# Patient Record
Sex: FEMALE | Race: WHITE | Employment: FULL TIME | URBAN - METROPOLITAN AREA
[De-identification: names, ages, dates, MRNs, and addresses within clinical notes are randomized per-mention and may not be internally consistent; named-entity substitution may affect disease eponyms.]

---

## 2016-02-17 LAB
AMB EXT HPV: NEGATIVE
AMB EXT THINPREP PAP: NEGATIVE

## 2018-07-26 ENCOUNTER — TELEPHONE (OUTPATIENT)
Dept: FAMILY MEDICINE CLINIC | Facility: CLINIC | Age: 33
End: 2018-07-26

## 2018-07-26 ENCOUNTER — OFFICE VISIT (OUTPATIENT)
Dept: FAMILY MEDICINE CLINIC | Facility: CLINIC | Age: 33
End: 2018-07-26
Payer: COMMERCIAL

## 2018-07-26 VITALS
RESPIRATION RATE: 16 BRPM | BODY MASS INDEX: 33.73 KG/M2 | TEMPERATURE: 98 F | HEIGHT: 64.5 IN | WEIGHT: 200 LBS | SYSTOLIC BLOOD PRESSURE: 126 MMHG | DIASTOLIC BLOOD PRESSURE: 80 MMHG | HEART RATE: 74 BPM

## 2018-07-26 DIAGNOSIS — R22.2 MASS OF SUBCUTANEOUS TISSUE OF BACK: ICD-10-CM

## 2018-07-26 DIAGNOSIS — R22.32 AXILLARY MASS, LEFT: Primary | ICD-10-CM

## 2018-07-26 DIAGNOSIS — Z00.00 ROUTINE MEDICAL EXAM: Primary | ICD-10-CM

## 2018-07-26 DIAGNOSIS — Z00.00 LABORATORY EXAM ORDERED AS PART OF ROUTINE GENERAL MEDICAL EXAMINATION: ICD-10-CM

## 2018-07-26 DIAGNOSIS — Z84.89 FAMILY HISTORY OF MALIGNANT HYPERTHERMIA: ICD-10-CM

## 2018-07-26 DIAGNOSIS — D49.7 PITUITARY TUMOR: ICD-10-CM

## 2018-07-26 PROCEDURE — 99385 PREV VISIT NEW AGE 18-39: CPT | Performed by: FAMILY MEDICINE

## 2018-07-26 PROCEDURE — 99213 OFFICE O/P EST LOW 20 MIN: CPT | Performed by: FAMILY MEDICINE

## 2018-07-26 RX ORDER — CLINDAMYCIN HYDROCHLORIDE 300 MG/1
1 CAPSULE ORAL 3 TIMES DAILY
COMMUNITY
Start: 2018-07-19 | End: 2018-09-06 | Stop reason: ALTCHOICE

## 2018-07-26 NOTE — PROGRESS NOTES
Pap Smear & HPV results received via fax from   Dr. Kiah Correa with [facility] in [place] [address], located at [facility] [facility], [address] in  [phone].   Results console updated & report placed in Dr. Lui Maya  In box fo

## 2018-07-26 NOTE — TELEPHONE ENCOUNTER
Message from [facility]:   Message   Received:  Today   Message Contents   Carlos Feliciano, April, RN   Caller: Unspecified (Today, 12:53 PM)             Diagnosis of malignant hyperthermia is best made based on testing performed on a muscle biopsy - I a

## 2018-07-26 NOTE — TELEPHONE ENCOUNTER
I spoke with radiologist who wanted to know if patient has had any imaging of breast?  Family history of breast cancer? You may want to do mammogram since the mass in axilla may be d/t breast problem.   He feels otherwise an ultrasound of the area would be

## 2018-07-26 NOTE — PROGRESS NOTES
Patient presents with:  New Patient  Physical: States she sees GYNE Dr. Loyd at [facility] for pap smears, last was 2016. HPI:  Eyad Kelly is a 26year old female here today for preventative visit.       Imms- had tdap with last pregnanc Unknown  Spouse name: N/A    Years of education: N/A  Number of children: N/A     Occupational History  None on file     Social History Main Topics   Smoking status: Never Smoker    Smokeless tobacco: Never Used    Alcohol use Yes    Comment: 1/wk, never a ASSESSMENT/PLAN:    1. Routine medical exam    2. Laboratory exam ordered as part of routine general medical examination  - LIPID PANEL; Future  - COMP METABOLIC PANEL (14); Future  - ASSAY, THYROID STIM HORMONE; Future    3.  Pituitary tumor  - ASSAY,

## 2018-07-26 NOTE — TELEPHONE ENCOUNTER
Routing message to Wellingtonbrennan Hollingsworth, is this something you would be able to assist with? Please see message below.

## 2018-07-26 NOTE — TELEPHONE ENCOUNTER
Pt's mother has a h/o malignant hyperthermia and is an autosomal dominant condition, so my patient could have this. She is considering having a surgery, but wants to know if she has it or not to assess her irsk.  I'm not sure who to send her to to evaluate

## 2018-07-26 NOTE — TELEPHONE ENCOUNTER
Also, she has a mass of the L posterior axilla ~ 10 x 10 cm in size and is enlarging. Please ask radiology what the appropriate imaging would be to evalluate and pend.  Thank you

## 2018-07-27 NOTE — TELEPHONE ENCOUNTER
I ordered and US of the L breast/axilla.  Will start with the [address] for imaging

## 2018-07-30 NOTE — TELEPHONE ENCOUNTER
Please ask Dr. Yvonne Higgins group if they have worked on helping to dx malignant hyperthermia (muscle bx)? If not, who would they recommend she be sent to do this?

## 2018-07-30 NOTE — TELEPHONE ENCOUNTER
I called Dr. Nicolás Rodriguez group to see if they would perform this kind of biopsy and no one in the group does complete that.  I called our hospital to verify who we should reach out to for muscle biopsy for malignant hyperthermia and Dr. Mariana Krueger said they would do

## 2018-07-31 NOTE — TELEPHONE ENCOUNTER
Referral done, please provide pt with contact information and let her know the process to determine if she has malignant hyperthermia is a muscle biopsy which would be done by a surgeon.

## 2018-08-01 ENCOUNTER — HOSPITAL ENCOUNTER (OUTPATIENT)
Dept: ULTRASOUND IMAGING | Age: 33
Discharge: HOME | End: 2018-08-01
Attending: FAMILY MEDICINE
Payer: COMMERCIAL

## 2018-08-01 DIAGNOSIS — R22.32 AXILLARY MASS, LEFT: ICD-10-CM

## 2018-08-01 PROCEDURE — 76882 US LMTD JT/FCL EVL NVASC XTR: CPT | Performed by: FAMILY MEDICINE

## 2018-08-16 ENCOUNTER — TELEPHONE (OUTPATIENT)
Dept: FAMILY MEDICINE CLINIC | Facility: CLINIC | Age: 33
End: 2018-08-16

## 2018-09-04 ENCOUNTER — TELEPHONE (OUTPATIENT)
Dept: FAMILY MEDICINE CLINIC | Facility: CLINIC | Age: 33
End: 2018-09-04

## 2018-09-04 DIAGNOSIS — D17.22 LIPOMA OF LEFT UPPER EXTREMITY: Primary | ICD-10-CM

## 2018-09-04 NOTE — TELEPHONE ENCOUNTER
Yes, I signed referral, but please discuss her mother's history at the visit, so if she agrees to remove that she is safe as well.

## 2018-09-04 NOTE — TELEPHONE ENCOUNTER
Patient would like to go see a surgeon about her lipoma. Will you refer her? She does want to come to this building. I pended an order to Dr. Willie Perez if okay with you.

## 2018-09-04 NOTE — TELEPHONE ENCOUNTER
MRI shows ~ 10 x 10 x 7.5 cm lipomatous lesion, so likely a lipoma. I know she was thinking about having this surgically removed. I know there were a lot of things to consider when thinking about this process as her mother has malignant hyperthermia.  Rey

## 2019-01-21 ENCOUNTER — APPOINTMENT (OUTPATIENT)
Dept: LAB | Age: 34
End: 2019-01-21
Attending: FAMILY MEDICINE
Payer: COMMERCIAL

## 2019-01-21 DIAGNOSIS — Z00.00 LABORATORY EXAM ORDERED AS PART OF ROUTINE GENERAL MEDICAL EXAMINATION: ICD-10-CM

## 2019-01-21 DIAGNOSIS — D49.7 PITUITARY TUMOR: ICD-10-CM

## 2019-01-21 LAB
ALBUMIN SERPL-MCNC: 3.6 G/DL (ref 3.1–4.5)
ALBUMIN/GLOB SERPL: 1 {RATIO} (ref 1–2)
ALP LIVER SERPL-CCNC: 65 U/L (ref 37–98)
ALT SERPL-CCNC: 25 U/L (ref 14–54)
ANION GAP SERPL CALC-SCNC: 4 MMOL/L (ref 0–18)
AST SERPL-CCNC: 20 U/L (ref 15–41)
BILIRUB SERPL-MCNC: 0.7 MG/DL (ref 0.1–2)
BUN BLD-MCNC: 8 MG/DL (ref 8–20)
BUN/CREAT SERPL: 8.2 (ref 10–20)
CALCIUM BLD-MCNC: 8.7 MG/DL (ref 8.3–10.3)
CHLORIDE SERPL-SCNC: 110 MMOL/L (ref 101–111)
CHOLEST SMN-MCNC: 160 MG/DL (ref ?–200)
CO2 SERPL-SCNC: 29 MMOL/L (ref 22–32)
CREAT BLD-MCNC: 0.97 MG/DL (ref 0.55–1.02)
GLOBULIN PLAS-MCNC: 3.7 G/DL (ref 2.8–4.4)
GLUCOSE BLD-MCNC: 71 MG/DL (ref 70–99)
HDLC SERPL-MCNC: 48 MG/DL (ref 40–59)
LDLC SERPL CALC-MCNC: 90 MG/DL (ref ?–100)
M PROTEIN MFR SERPL ELPH: 7.3 G/DL (ref 6.4–8.2)
NONHDLC SERPL-MCNC: 112 MG/DL (ref ?–130)
OSMOLALITY SERPL CALC.SUM OF ELEC: 293 MOSM/KG (ref 275–295)
POTASSIUM SERPL-SCNC: 4.5 MMOL/L (ref 3.6–5.1)
SODIUM SERPL-SCNC: 143 MMOL/L (ref 136–144)
TRIGL SERPL-MCNC: 112 MG/DL (ref 30–149)
TSI SER-ACNC: 0.74 MIU/ML (ref 0.35–5.5)
VLDLC SERPL CALC-MCNC: 22 MG/DL (ref 0–30)

## 2019-01-21 PROCEDURE — 36415 COLL VENOUS BLD VENIPUNCTURE: CPT

## 2019-01-21 PROCEDURE — 84443 ASSAY THYROID STIM HORMONE: CPT

## 2019-01-21 PROCEDURE — 80061 LIPID PANEL: CPT

## 2019-01-21 PROCEDURE — 80053 COMPREHEN METABOLIC PANEL: CPT

## 2019-01-23 ENCOUNTER — TELEPHONE (OUTPATIENT)
Dept: FAMILY MEDICINE CLINIC | Facility: CLINIC | Age: 34
End: 2019-01-23

## 2019-03-28 ENCOUNTER — OFFICE VISIT (OUTPATIENT)
Dept: FAMILY MEDICINE CLINIC | Facility: CLINIC | Age: 34
End: 2019-03-28
Payer: COMMERCIAL

## 2019-03-28 VITALS
TEMPERATURE: 98 F | RESPIRATION RATE: 16 BRPM | SYSTOLIC BLOOD PRESSURE: 122 MMHG | BODY MASS INDEX: 35.25 KG/M2 | HEIGHT: 64.5 IN | DIASTOLIC BLOOD PRESSURE: 78 MMHG | HEART RATE: 72 BPM | WEIGHT: 209 LBS

## 2019-03-28 DIAGNOSIS — J02.9 PHARYNGITIS, UNSPECIFIED ETIOLOGY: ICD-10-CM

## 2019-03-28 DIAGNOSIS — H69.82 EUSTACHIAN TUBE DYSFUNCTION, LEFT: ICD-10-CM

## 2019-03-28 DIAGNOSIS — J02.0 STREP PHARYNGITIS: Primary | ICD-10-CM

## 2019-03-28 LAB
CONTROL LINE PRESENT WITH A CLEAR BACKGROUND (YES/NO): YES YES/NO
CONTROL LINE PRESENT WITH A CLEAR BACKGROUND (YES/NO): YES YES/NO
MONONUCLEOSIS TEST, QUAL: NEGATIVE
STREP GRP A CUL-SCR: POSITIVE

## 2019-03-28 PROCEDURE — 36415 COLL VENOUS BLD VENIPUNCTURE: CPT | Performed by: FAMILY MEDICINE

## 2019-03-28 PROCEDURE — 86308 HETEROPHILE ANTIBODY SCREEN: CPT | Performed by: FAMILY MEDICINE

## 2019-03-28 PROCEDURE — 87880 STREP A ASSAY W/OPTIC: CPT | Performed by: FAMILY MEDICINE

## 2019-03-28 PROCEDURE — 99213 OFFICE O/P EST LOW 20 MIN: CPT | Performed by: FAMILY MEDICINE

## 2019-03-28 RX ORDER — AMOXICILLIN AND CLAVULANATE POTASSIUM 875; 125 MG/1; MG/1
1 TABLET, FILM COATED ORAL 2 TIMES DAILY
Qty: 20 TABLET | Refills: 0 | Status: SHIPPED | OUTPATIENT
Start: 2019-03-28 | End: 2019-04-07

## 2019-03-28 NOTE — PROGRESS NOTES
[facility] Visit Note  3/28/2019      Subjective:      Patient ID: Carolyn Fowler is a 26year old female. Chief Complaint:  Patient presents with:  Sore Throat: x 2 days.  States on 03/12/19 treated at [address] & was dx with Strep, above.      Return if symptoms worsen or fail to improve.

## 2019-03-28 NOTE — PATIENT INSTRUCTIONS
Pharyngitis: Strep (Confirmed)    You have had a positive test for strep throat. Strep throat is a contagious illness. It is spread by coughing, kissing or by touching others after touching your mouth or nose.  Symptoms include throat pain that is worse w · Lymph nodes getting larger or becoming soft in the middle  · You can't swallow liquids or you can't open your mouth wide because of throat pain  · Signs of dehydration. These include very dark urine or no urine, sunken eyes, and dizziness.   · Trouble theresa A medical evaluation can help find the cause of your sore throat. It can also help your healthcare provider choose the best treatment for you. The evaluation may include a health history, physical exam, and diagnostic tests.   Health history  Your healthcar · Gargle with warm saltwater (1 teaspoon of salt to 8 ounces of warm water). · Use a humidifier to keep air moist and relieve throat dryness. · Try over-the-counter pain relievers such as acetaminophen or ibuprofen.  Use as directed, and don’t exceed the · A skin rash, hives, or wheezing develops. Any of these could signal an allergic reaction to antibiotics. · Symptoms don’t improve within a week. · Symptoms don’t improve within 2 to 3 days of starting antibiotics.    Date Last Reviewed: 10/1/2016  © 200 · Remember: unless a sore throat is caused by a bacterial infection, antibiotics won’t help you. Prevent future sore throats  Prevention tips include the following:  · Stop smoking or reduce contact with secondhand smoke.  Smoke irritates the tender throat

## 2019-04-04 ENCOUNTER — TELEPHONE (OUTPATIENT)
Dept: FAMILY MEDICINE CLINIC | Facility: CLINIC | Age: 34
End: 2019-04-04

## 2019-04-04 DIAGNOSIS — R19.5 ABNORMAL FINDINGS IN STOOL: Primary | ICD-10-CM

## 2019-04-04 NOTE — TELEPHONE ENCOUNTER
One of 2 ways. She can collect a stool sample and we can call with results.  Or, she can do the scotch tape method, which is at night ~12-2 AM (they come out in the middle of the night) use a piece of scotch tape around the anus to see if any tiny white wor

## 2019-04-04 NOTE — TELEPHONE ENCOUNTER
Patient states her son tested positive for strep throat, he is not a patient here but she has questions about her own family's health. She thinks after researching that he may have some kind of worms and wants to know if the family should be tested.  Her so

## 2019-04-04 NOTE — TELEPHONE ENCOUNTER
Spoke to pt, last OV 3/28/19 Dr. Nick Mooney with +Strep and treated with ABTX which pt states she is still on. Son diagnosed with strep yesterday per Ped MD, just potty trained and mother was using a cream on his buttocks for redness recently.  She googled t

## 2019-04-05 NOTE — TELEPHONE ENCOUNTER
Informed pt of Dr. Errol Bernabe' recommendations, pt is opting for the scotch tape test. Pt will keep us informed if positive results. Task completed at this point, can reopen with pt's call.

## 2019-07-01 ENCOUNTER — MED REC SCAN ONLY (OUTPATIENT)
Dept: FAMILY MEDICINE CLINIC | Facility: CLINIC | Age: 34
End: 2019-07-01

## 2020-02-04 ENCOUNTER — LAB ENCOUNTER (OUTPATIENT)
Dept: LAB | Age: 35
End: 2020-02-04
Attending: FAMILY MEDICINE
Payer: COMMERCIAL

## 2020-02-04 ENCOUNTER — OFFICE VISIT (OUTPATIENT)
Dept: FAMILY MEDICINE CLINIC | Facility: CLINIC | Age: 35
End: 2020-02-04
Payer: COMMERCIAL

## 2020-02-04 VITALS
HEIGHT: 64 IN | HEART RATE: 60 BPM | DIASTOLIC BLOOD PRESSURE: 66 MMHG | SYSTOLIC BLOOD PRESSURE: 118 MMHG | BODY MASS INDEX: 35.17 KG/M2 | TEMPERATURE: 97 F | OXYGEN SATURATION: 99 % | RESPIRATION RATE: 16 BRPM | WEIGHT: 206 LBS

## 2020-02-04 DIAGNOSIS — M25.50 POLYARTHRALGIA: ICD-10-CM

## 2020-02-04 DIAGNOSIS — Z00.00 LABORATORY EXAM ORDERED AS PART OF ROUTINE GENERAL MEDICAL EXAMINATION: ICD-10-CM

## 2020-02-04 DIAGNOSIS — R79.89 ELEVATED TESTOSTERONE LEVEL IN FEMALE: ICD-10-CM

## 2020-02-04 DIAGNOSIS — E55.9 VITAMIN D DEFICIENCY: ICD-10-CM

## 2020-02-04 DIAGNOSIS — D49.7 PITUITARY TUMOR: ICD-10-CM

## 2020-02-04 DIAGNOSIS — Z00.00 ROUTINE MEDICAL EXAM: Primary | ICD-10-CM

## 2020-02-04 DIAGNOSIS — Z82.61 FAMILY HISTORY OF RHEUMATOID ARTHRITIS: ICD-10-CM

## 2020-02-04 LAB
ALBUMIN SERPL-MCNC: 3.7 G/DL (ref 3.4–5)
ALBUMIN/GLOB SERPL: 0.9 {RATIO} (ref 1–2)
ALP LIVER SERPL-CCNC: 65 U/L (ref 37–98)
ALT SERPL-CCNC: 18 U/L (ref 13–56)
ANION GAP SERPL CALC-SCNC: 4 MMOL/L (ref 0–18)
AST SERPL-CCNC: 14 U/L (ref 15–37)
BASOPHILS # BLD AUTO: 0.03 X10(3) UL (ref 0–0.2)
BASOPHILS NFR BLD AUTO: 0.5 %
BILIRUB SERPL-MCNC: 0.7 MG/DL (ref 0.1–2)
BUN BLD-MCNC: 8 MG/DL (ref 7–18)
BUN/CREAT SERPL: 9.8 (ref 10–20)
CALCIUM BLD-MCNC: 9 MG/DL (ref 8.5–10.1)
CHLORIDE SERPL-SCNC: 108 MMOL/L (ref 98–112)
CHOLEST SMN-MCNC: 192 MG/DL (ref ?–200)
CO2 SERPL-SCNC: 27 MMOL/L (ref 21–32)
CREAT BLD-MCNC: 0.82 MG/DL (ref 0.55–1.02)
CRP SERPL-MCNC: <0.29 MG/DL (ref ?–0.3)
DEPRECATED RDW RBC AUTO: 41.5 FL (ref 35.1–46.3)
EOSINOPHIL # BLD AUTO: 0.05 X10(3) UL (ref 0–0.7)
EOSINOPHIL NFR BLD AUTO: 0.9 %
ERYTHROCYTE [DISTWIDTH] IN BLOOD BY AUTOMATED COUNT: 12.3 % (ref 11–15)
GLOBULIN PLAS-MCNC: 3.9 G/DL (ref 2.8–4.4)
GLUCOSE BLD-MCNC: 77 MG/DL (ref 70–99)
HCT VFR BLD AUTO: 41.4 % (ref 35–48)
HDLC SERPL-MCNC: 50 MG/DL (ref 40–59)
HGB BLD-MCNC: 13.5 G/DL (ref 12–16)
IMM GRANULOCYTES # BLD AUTO: 0.01 X10(3) UL (ref 0–1)
IMM GRANULOCYTES NFR BLD: 0.2 %
LDLC SERPL CALC-MCNC: 97 MG/DL (ref ?–100)
LYMPHOCYTES # BLD AUTO: 1.72 X10(3) UL (ref 1–4)
LYMPHOCYTES NFR BLD AUTO: 30.4 %
M PROTEIN MFR SERPL ELPH: 7.6 G/DL (ref 6.4–8.2)
MCH RBC QN AUTO: 30 PG (ref 26–34)
MCHC RBC AUTO-ENTMCNC: 32.6 G/DL (ref 31–37)
MCV RBC AUTO: 92 FL (ref 80–100)
MONOCYTES # BLD AUTO: 0.35 X10(3) UL (ref 0.1–1)
MONOCYTES NFR BLD AUTO: 6.2 %
NEUTROPHILS # BLD AUTO: 3.5 X10 (3) UL (ref 1.5–7.7)
NEUTROPHILS # BLD AUTO: 3.5 X10(3) UL (ref 1.5–7.7)
NEUTROPHILS NFR BLD AUTO: 61.8 %
NONHDLC SERPL-MCNC: 142 MG/DL (ref ?–130)
OSMOLALITY SERPL CALC.SUM OF ELEC: 285 MOSM/KG (ref 275–295)
PATIENT FASTING Y/N/NP: YES
PATIENT FASTING Y/N/NP: YES
PLATELET # BLD AUTO: 299 10(3)UL (ref 150–450)
POTASSIUM SERPL-SCNC: 4 MMOL/L (ref 3.5–5.1)
RBC # BLD AUTO: 4.5 X10(6)UL (ref 3.8–5.3)
RHEUMATOID FACT SERPL-ACNC: <10 IU/ML (ref ?–15)
SED RATE-ML: 13 MM/HR (ref 0–25)
SODIUM SERPL-SCNC: 139 MMOL/L (ref 136–145)
T4 FREE SERPL-MCNC: 1 NG/DL (ref 0.8–1.7)
TRIGL SERPL-MCNC: 226 MG/DL (ref 30–149)
TSI SER-ACNC: 1.32 MIU/ML (ref 0.36–3.74)
URATE SERPL-MCNC: 4.9 MG/DL (ref 2.6–6)
VIT B12 SERPL-MCNC: 311 PG/ML (ref 193–986)
VIT D+METAB SERPL-MCNC: 15.5 NG/ML (ref 30–100)
VLDLC SERPL CALC-MCNC: 45 MG/DL (ref 0–30)
WBC # BLD AUTO: 5.7 X10(3) UL (ref 4–11)

## 2020-02-04 PROCEDURE — 36415 COLL VENOUS BLD VENIPUNCTURE: CPT | Performed by: FAMILY MEDICINE

## 2020-02-04 PROCEDURE — 86431 RHEUMATOID FACTOR QUANT: CPT | Performed by: FAMILY MEDICINE

## 2020-02-04 PROCEDURE — 84439 ASSAY OF FREE THYROXINE: CPT | Performed by: FAMILY MEDICINE

## 2020-02-04 PROCEDURE — 80061 LIPID PANEL: CPT | Performed by: FAMILY MEDICINE

## 2020-02-04 PROCEDURE — 80050 GENERAL HEALTH PANEL: CPT | Performed by: FAMILY MEDICINE

## 2020-02-04 PROCEDURE — 82607 VITAMIN B-12: CPT | Performed by: FAMILY MEDICINE

## 2020-02-04 PROCEDURE — 85652 RBC SED RATE AUTOMATED: CPT | Performed by: FAMILY MEDICINE

## 2020-02-04 PROCEDURE — 84402 ASSAY OF FREE TESTOSTERONE: CPT | Performed by: FAMILY MEDICINE

## 2020-02-04 PROCEDURE — 86200 CCP ANTIBODY: CPT | Performed by: FAMILY MEDICINE

## 2020-02-04 PROCEDURE — 84403 ASSAY OF TOTAL TESTOSTERONE: CPT | Performed by: FAMILY MEDICINE

## 2020-02-04 PROCEDURE — 82306 VITAMIN D 25 HYDROXY: CPT | Performed by: FAMILY MEDICINE

## 2020-02-04 PROCEDURE — 86140 C-REACTIVE PROTEIN: CPT | Performed by: FAMILY MEDICINE

## 2020-02-04 PROCEDURE — 86038 ANTINUCLEAR ANTIBODIES: CPT | Performed by: FAMILY MEDICINE

## 2020-02-04 PROCEDURE — 99213 OFFICE O/P EST LOW 20 MIN: CPT | Performed by: FAMILY MEDICINE

## 2020-02-04 PROCEDURE — 99395 PREV VISIT EST AGE 18-39: CPT | Performed by: FAMILY MEDICINE

## 2020-02-04 PROCEDURE — 84550 ASSAY OF BLOOD/URIC ACID: CPT | Performed by: FAMILY MEDICINE

## 2020-02-04 RX ORDER — MELATONIN
2000 DAILY
COMMUNITY

## 2020-02-04 NOTE — PROGRESS NOTES
Patient presents with:  Physical: Has an appt with gyne today for her pap- Dr. Erica Saha in [id number] [address]. Lab: along with routine labs, pt is requesting Vitamin b-12 levels to be checked, she states she does take vitamin B-12 2000mcg daily.       HPI:  Tiara Press daily., Disp: , Rfl:     No current facility-administered medications on file prior to visit.      No Known Allergies  Social History    Socioeconomic History      Marital status:       Spouse name: Not on file      Number of children: Not on file of Onset   • Hypertension Father    • Heart Disease Father    • High Cholesterol Father    • Other (COPD) Father    • Other (gout) Father    • Hypertension Mother    • High Cholesterol Mother    • Malignent Hypothermia Mother    • Other (Pre-Diabetes) Moth Future  - VITAMIN B12; Future  - VITAMIN D, 25-HYDROXY; Future    3. Vitamin D deficiency  - VITAMIN D, 25-HYDROXY; Future    4. Family history of rheumatoid arthritis  - RHEUMATOID ARTHRITIS FACTOR; Future    5.  Polyarthralgia  - RHEUMATOID ARTHRITIS FACT

## 2020-02-05 LAB
ANA SCREEN: NEGATIVE
CCP IGG SERPL-ACNC: 0.4 U/ML (ref 0–6.9)

## 2020-02-10 ENCOUNTER — PATIENT MESSAGE (OUTPATIENT)
Dept: FAMILY MEDICINE CLINIC | Facility: CLINIC | Age: 35
End: 2020-02-10

## 2020-02-10 NOTE — TELEPHONE ENCOUNTER
From: Ashok Echavarria  To: Dorrie Gitelman, MD  Sent: 2/10/2020 10:55 AM CST  Subject: Referral Request    Good morning,    In 2018, Dr. Laura Márquez provided me with a referral for a surgeon who could perform a lipoma removal procedure.  I have misplaced t

## 2020-02-10 NOTE — TELEPHONE ENCOUNTER
Referral information provided to patient  Provider Address Phone   [facility], [address] [phone]

## 2020-02-13 PROBLEM — E55.9 VITAMIN D DEFICIENCY: Status: ACTIVE | Noted: 2020-02-01

## 2020-02-13 PROBLEM — E78.1 HYPERTRIGLYCERIDEMIA WITHOUT HYPERCHOLESTEROLEMIA: Status: ACTIVE | Noted: 2020-02-01

## 2020-02-14 RX ORDER — ERGOCALCIFEROL 1.25 MG/1
50000 CAPSULE ORAL WEEKLY
Qty: 8 CAPSULE | Refills: 0 | Status: SHIPPED | OUTPATIENT
Start: 2020-02-14 | End: 2020-04-14

## 2020-02-17 ENCOUNTER — OFFICE VISIT (OUTPATIENT)
Dept: SURGERY | Facility: CLINIC | Age: 35
End: 2020-02-17
Payer: COMMERCIAL

## 2020-02-17 VITALS
BODY MASS INDEX: 35 KG/M2 | HEART RATE: 60 BPM | HEIGHT: 64 IN | WEIGHT: 205 LBS | SYSTOLIC BLOOD PRESSURE: 132 MMHG | RESPIRATION RATE: 16 BRPM | DIASTOLIC BLOOD PRESSURE: 82 MMHG

## 2020-02-17 DIAGNOSIS — Z84.89 FAMILY HISTORY OF MALIGNANT HYPERTHERMIA: ICD-10-CM

## 2020-02-17 DIAGNOSIS — D17.1 LIPOMA OF SKIN AND SUBCUTANEOUS TISSUE OF TRUNK: Primary | ICD-10-CM

## 2020-02-17 PROCEDURE — 99243 OFF/OP CNSLTJ NEW/EST LOW 30: CPT | Performed by: SURGERY

## 2020-02-17 NOTE — H&P
New Patient Visit Note       Active Problems      1. Lipoma of skin and subcutaneous tissue of trunk        Chief Complaint   Patient presents with:  Mass: NW PT ref by PCP for mass, imaging done 2018.  PT states that she has had back shoulder mass since 18 Eyes: Negative for pain, discharge, redness and visual disturbance. Respiratory: Negative for cough, shortness of breath and wheezing. Cardiovascular: Negative for chest pain and palpitations.    Gastrointestinal: Negative for abdominal distention, abd The patient states that this has been present since 2007. She did see a surgeon in 2014 who recommended excision but Helio Araujo was getting  at the time and did not want to undergo surgery.   The patient states that this mass has increased in size and Expected postoperative pain, recuperation and postoperative course was also reviewed. All questions from the patient were answered in detail. The patient did verbalize understanding and does not have any further questions at this time.   The patient does

## 2020-02-24 LAB
SEX HORMONE BINDING GLOBULIN: 30 NMOL/L
TESTOSTERONE -MS, BIOAVAILAB: 6.7 NG/DL
TESTOSTERONE, -MS/MS: 14 NG/DL
TESTOSTERONE, FREE -MS/MS: 2.5 PG/ML

## 2020-03-28 ENCOUNTER — E-VISIT (OUTPATIENT)
Dept: FAMILY MEDICINE CLINIC | Facility: CLINIC | Age: 35
End: 2020-03-28

## 2020-03-28 ENCOUNTER — PATIENT MESSAGE (OUTPATIENT)
Dept: FAMILY MEDICINE CLINIC | Facility: CLINIC | Age: 35
End: 2020-03-28

## 2020-03-28 DIAGNOSIS — R05.9 COUGH: Primary | ICD-10-CM

## 2020-03-28 DIAGNOSIS — R07.0 THROAT DISCOMFORT: ICD-10-CM

## 2020-03-29 NOTE — PROGRESS NOTES
Phi Ambriz is a 37year old female. HPI:   See answers to questions above. Current Outpatient Medications   Medication Sig Dispense Refill   • ergocalciferol 1.25 MG (43353 UT) Oral Cap Take 1 capsule (50,000 Units total) by mouth once a week. Patient advised to follow up with PCP if no improvement or worsening of symptoms  Refer to MyChart message for specific patient instructions

## 2020-03-30 RX ORDER — ERGOCALCIFEROL 1.25 MG/1
CAPSULE ORAL
Qty: 4 CAPSULE | Refills: 1 | OUTPATIENT
Start: 2020-03-30

## 2020-03-30 RX ORDER — ALBUTEROL SULFATE 90 UG/1
2 AEROSOL, METERED RESPIRATORY (INHALATION) EVERY 4 HOURS PRN
Qty: 1 INHALER | Refills: 0 | Status: SHIPPED | OUTPATIENT
Start: 2020-03-30 | End: 2021-02-04 | Stop reason: ALTCHOICE

## 2020-03-30 NOTE — TELEPHONE ENCOUNTER
From: Ashok Echavarria  To: Dorrie Gitelman, MD  Sent: 3/28/2020 11:02 AM CDT  Subject: Non-Urgent Medical Question    Hi,    I have been experiencing a frog in the throat and esophagus/chest sensation on and off for two weeks.  Better in the morning an

## 2020-04-02 NOTE — TELEPHONE ENCOUNTER
Sx: increased swallowing and a \"frog\" sensation in her throat. She did a video visit on 3/28 the same day she messaged and was put on omeprazole for possible reflux and seems to be helping. Was eating fajitas with hot sauce, coffee, onions.  Seems reta

## 2020-09-30 ENCOUNTER — TELEPHONE (OUTPATIENT)
Dept: FAMILY MEDICINE CLINIC | Facility: CLINIC | Age: 35
End: 2020-09-30

## 2020-09-30 NOTE — TELEPHONE ENCOUNTER
Pt faxed Medical Record Release form on 9/29/20 to release the 2018 MRI results to [place]. Faxed results to Lasha Velásquez with [facility] on 9/30/20    Fax: [phone]  Phone: [phone]    Fax confirmation received.

## 2021-02-04 ENCOUNTER — LAB ENCOUNTER (OUTPATIENT)
Dept: LAB | Age: 36
End: 2021-02-04
Attending: FAMILY MEDICINE
Payer: COMMERCIAL

## 2021-02-04 ENCOUNTER — OFFICE VISIT (OUTPATIENT)
Dept: FAMILY MEDICINE CLINIC | Facility: CLINIC | Age: 36
End: 2021-02-04
Payer: COMMERCIAL

## 2021-02-04 VITALS
WEIGHT: 202 LBS | BODY MASS INDEX: 34.49 KG/M2 | SYSTOLIC BLOOD PRESSURE: 120 MMHG | RESPIRATION RATE: 16 BRPM | OXYGEN SATURATION: 98 % | DIASTOLIC BLOOD PRESSURE: 70 MMHG | HEIGHT: 64 IN | TEMPERATURE: 98 F | HEART RATE: 96 BPM

## 2021-02-04 DIAGNOSIS — Z00.00 ROUTINE MEDICAL EXAM: Primary | ICD-10-CM

## 2021-02-04 DIAGNOSIS — E55.9 VITAMIN D DEFICIENCY: ICD-10-CM

## 2021-02-04 DIAGNOSIS — E78.1 HYPERTRIGLYCERIDEMIA WITHOUT HYPERCHOLESTEROLEMIA: ICD-10-CM

## 2021-02-04 DIAGNOSIS — Z00.00 LABORATORY EXAM ORDERED AS PART OF ROUTINE GENERAL MEDICAL EXAMINATION: ICD-10-CM

## 2021-02-04 PROBLEM — R22.2 MASS OF SUBCUTANEOUS TISSUE OF BACK: Status: RESOLVED | Noted: 2018-07-26 | Resolved: 2021-02-04

## 2021-02-04 LAB
ALBUMIN SERPL-MCNC: 3.8 G/DL (ref 3.4–5)
ALBUMIN/GLOB SERPL: 1.1 {RATIO} (ref 1–2)
ALP LIVER SERPL-CCNC: 60 U/L
ALT SERPL-CCNC: 21 U/L
ANION GAP SERPL CALC-SCNC: 4 MMOL/L (ref 0–18)
AST SERPL-CCNC: 20 U/L (ref 15–37)
BASOPHILS # BLD AUTO: 0.04 X10(3) UL (ref 0–0.2)
BASOPHILS NFR BLD AUTO: 0.8 %
BILIRUB SERPL-MCNC: 0.7 MG/DL (ref 0.1–2)
BUN BLD-MCNC: 10 MG/DL (ref 7–18)
BUN/CREAT SERPL: 11.6 (ref 10–20)
CALCIUM BLD-MCNC: 8.9 MG/DL (ref 8.5–10.1)
CHLORIDE SERPL-SCNC: 110 MMOL/L (ref 98–112)
CHOLEST SMN-MCNC: 208 MG/DL (ref ?–200)
CO2 SERPL-SCNC: 28 MMOL/L (ref 21–32)
CREAT BLD-MCNC: 0.86 MG/DL
DEPRECATED RDW RBC AUTO: 42.1 FL (ref 35.1–46.3)
EOSINOPHIL # BLD AUTO: 0.07 X10(3) UL (ref 0–0.7)
EOSINOPHIL NFR BLD AUTO: 1.4 %
ERYTHROCYTE [DISTWIDTH] IN BLOOD BY AUTOMATED COUNT: 12.2 % (ref 11–15)
GLOBULIN PLAS-MCNC: 3.6 G/DL (ref 2.8–4.4)
GLUCOSE BLD-MCNC: 70 MG/DL (ref 70–99)
HCT VFR BLD AUTO: 43.5 %
HDLC SERPL-MCNC: 57 MG/DL (ref 40–59)
HGB BLD-MCNC: 13.9 G/DL
IMM GRANULOCYTES # BLD AUTO: 0.03 X10(3) UL (ref 0–1)
IMM GRANULOCYTES NFR BLD: 0.6 %
LDLC SERPL CALC-MCNC: 115 MG/DL (ref ?–100)
LYMPHOCYTES # BLD AUTO: 1.38 X10(3) UL (ref 1–4)
LYMPHOCYTES NFR BLD AUTO: 27.1 %
M PROTEIN MFR SERPL ELPH: 7.4 G/DL (ref 6.4–8.2)
MCH RBC QN AUTO: 30 PG (ref 26–34)
MCHC RBC AUTO-ENTMCNC: 32 G/DL (ref 31–37)
MCV RBC AUTO: 94 FL
MONOCYTES # BLD AUTO: 0.45 X10(3) UL (ref 0.1–1)
MONOCYTES NFR BLD AUTO: 8.8 %
NEUTROPHILS # BLD AUTO: 3.13 X10 (3) UL (ref 1.5–7.7)
NEUTROPHILS # BLD AUTO: 3.13 X10(3) UL (ref 1.5–7.7)
NEUTROPHILS NFR BLD AUTO: 61.3 %
NONHDLC SERPL-MCNC: 151 MG/DL (ref ?–130)
OSMOLALITY SERPL CALC.SUM OF ELEC: 291 MOSM/KG (ref 275–295)
PATIENT FASTING Y/N/NP: YES
PATIENT FASTING Y/N/NP: YES
PLATELET # BLD AUTO: 292 10(3)UL (ref 150–450)
POTASSIUM SERPL-SCNC: 4.4 MMOL/L (ref 3.5–5.1)
RBC # BLD AUTO: 4.63 X10(6)UL
SODIUM SERPL-SCNC: 142 MMOL/L (ref 136–145)
TRIGL SERPL-MCNC: 178 MG/DL (ref 30–149)
TSI SER-ACNC: 1.5 MIU/ML (ref 0.36–3.74)
VIT D+METAB SERPL-MCNC: 25 NG/ML (ref 30–100)
VLDLC SERPL CALC-MCNC: 36 MG/DL (ref 0–30)
WBC # BLD AUTO: 5.1 X10(3) UL (ref 4–11)

## 2021-02-04 PROCEDURE — 85025 COMPLETE CBC W/AUTO DIFF WBC: CPT

## 2021-02-04 PROCEDURE — 82306 VITAMIN D 25 HYDROXY: CPT

## 2021-02-04 PROCEDURE — 99395 PREV VISIT EST AGE 18-39: CPT | Performed by: FAMILY MEDICINE

## 2021-02-04 PROCEDURE — 3074F SYST BP LT 130 MM HG: CPT | Performed by: FAMILY MEDICINE

## 2021-02-04 PROCEDURE — 36415 COLL VENOUS BLD VENIPUNCTURE: CPT

## 2021-02-04 PROCEDURE — 80061 LIPID PANEL: CPT

## 2021-02-04 PROCEDURE — 84443 ASSAY THYROID STIM HORMONE: CPT

## 2021-02-04 PROCEDURE — 80053 COMPREHEN METABOLIC PANEL: CPT

## 2021-02-04 PROCEDURE — 3008F BODY MASS INDEX DOCD: CPT | Performed by: FAMILY MEDICINE

## 2021-02-04 PROCEDURE — 3078F DIAST BP <80 MM HG: CPT | Performed by: FAMILY MEDICINE

## 2021-02-04 RX ORDER — PROPRANOLOL/HYDROCHLOROTHIAZID 40 MG-25MG
2 TABLET ORAL DAILY
COMMUNITY
Start: 2021-01-25

## 2021-02-04 RX ORDER — CHLORAL HYDRATE 500 MG
1 CAPSULE ORAL DAILY
COMMUNITY
Start: 2021-01-25

## 2021-02-04 RX ORDER — CHOLECALCIFEROL (VITAMIN D3) 125 MCG
2 CAPSULE ORAL DAILY
COMMUNITY
Start: 2021-01-01

## 2021-02-04 NOTE — PROGRESS NOTES
Patient presents with:  Physical: pap is scheduled for 03/08/21 with Dr. Konrad Sheets      HPI:  Luisito Conner is a 38year old female here today for preventative visit. Oyim-kb-ag-date with Tdap & flu.  Will discuss covid vaccines.        Cervical endocrine clinic unless recommended by PCP.   RTC prn.            Past Medical History:   Diagnosis Date   • Abnormal Pap smear of cervix 2008    + LEEP, Dr. Denver Vasquez, [facility], + ASCUS neg HPV on 1/29/19 pap   • Bilateral plantar fasciitis    • Chemical p Male        Birth control/protection: Vasectomy    Lifestyle      Physical activity        Days per week: Not on file        Minutes per session: Not on file      Stress: Not on file    Relationships      Social connections        Talks on phone: Not on fi Extraocular movements intact, pupils equally round and reactive to light,  conjunctivae normal.    Ears- Tympanic membranes intact bilaterally. Nose/Mouth/Throat-wearing mask  NECK:  No submental, submandibular, ant/post cervical lymphadenopathy.  No thy

## 2021-03-23 ENCOUNTER — PATIENT MESSAGE (OUTPATIENT)
Dept: FAMILY MEDICINE CLINIC | Facility: CLINIC | Age: 36
End: 2021-03-23

## 2021-03-23 NOTE — TELEPHONE ENCOUNTER
From: Dakota Echavarria  To: Kacie Ng MD  Sent: 3/23/2021 9:35 AM CDT  Subject: Other    Hi,    I have received my first dose of the [facility]. The second dose is scheduled April 10th.     Thanks,  Arie Galeana

## 2021-04-12 ENCOUNTER — PATIENT MESSAGE (OUTPATIENT)
Dept: FAMILY MEDICINE CLINIC | Facility: CLINIC | Age: 36
End: 2021-04-12

## 2021-04-12 NOTE — TELEPHONE ENCOUNTER
From: Rafael Echavarria  To: Therese Yung MD  Sent: 4/12/2021 10:50 AM CDT  Subject: Other    Good Morning,    I have received my second dose of the [employer] covid vaccine through [place] on April 9th. Please update my chart.     Thank you,  Lizzy Branch

## 2021-11-15 ENCOUNTER — OFFICE VISIT (OUTPATIENT)
Dept: FAMILY MEDICINE CLINIC | Facility: CLINIC | Age: 36
End: 2021-11-15
Payer: COMMERCIAL

## 2021-11-15 VITALS
RESPIRATION RATE: 16 BRPM | OXYGEN SATURATION: 98 % | HEART RATE: 72 BPM | DIASTOLIC BLOOD PRESSURE: 70 MMHG | BODY MASS INDEX: 36.7 KG/M2 | HEIGHT: 64 IN | SYSTOLIC BLOOD PRESSURE: 120 MMHG | TEMPERATURE: 98 F | WEIGHT: 215 LBS

## 2021-11-15 DIAGNOSIS — M54.6 ACUTE LEFT-SIDED THORACIC BACK PAIN: Primary | ICD-10-CM

## 2021-11-15 PROCEDURE — 3008F BODY MASS INDEX DOCD: CPT | Performed by: FAMILY MEDICINE

## 2021-11-15 PROCEDURE — 99213 OFFICE O/P EST LOW 20 MIN: CPT | Performed by: FAMILY MEDICINE

## 2021-11-15 PROCEDURE — 3078F DIAST BP <80 MM HG: CPT | Performed by: FAMILY MEDICINE

## 2021-11-15 PROCEDURE — 3074F SYST BP LT 130 MM HG: CPT | Performed by: FAMILY MEDICINE

## 2021-11-15 NOTE — PROGRESS NOTES
[facility] Visit Note  11/15/2021      Subjective:      Patient ID: Eric Weiss is a 43year old female. Chief Complaint:  Patient presents with:  Low Back Pain: left low back in the mornings.       HPI:  Eric Weiss is a 43 year ol

## 2021-11-15 NOTE — PATIENT INSTRUCTIONS
Back Exercises: Abdominal Lift Brace with Marching    The abdominal lift brace with march strengthens your lower abdominal muscles, helping you keep your pelvis and back stable:  · Lie on the floor with both knees bent.  Put your feet flat on the floor an slightly. · Hold for 5 seconds. Return to starting position. · Repeat 5 times. Claire last reviewed this educational content on 3/1/2018  © 4408-7190 [employer]. All rights reserved.  This information is not intended as a substitute for should feel comfortable and relaxed in this position:  · Start by tightening your abdominal muscles. · Lift one bent knee off the floor 2 to 4 inches. · Hold for 10 seconds. Return to start position. · Repeat 3 times. · Switch legs.   [employer] last revi

## 2022-01-07 ENCOUNTER — HOSPITAL ENCOUNTER (OUTPATIENT)
Age: 37
Discharge: HOME | End: 2022-01-07
Payer: COMMERCIAL

## 2022-01-07 VITALS
RESPIRATION RATE: 18 BRPM | HEART RATE: 72 BPM | OXYGEN SATURATION: 99 % | DIASTOLIC BLOOD PRESSURE: 89 MMHG | TEMPERATURE: 98 F | SYSTOLIC BLOOD PRESSURE: 149 MMHG

## 2022-01-07 DIAGNOSIS — Z20.822 EXPOSURE TO COVID-19 VIRUS: Primary | ICD-10-CM

## 2022-01-07 PROCEDURE — U0003 INFECTIOUS AGENT DETECTION BY NUCLEIC ACID (DNA OR RNA); SEVERE ACUTE RESPIRATORY SYNDROME CORONAVIRUS 2 (SARS-COV-2) (CORONAVIRUS DISEASE [COVID-19]), AMPLIFIED PROBE TECHNIQUE, MAKING USE OF HIGH THROUGHPUT TECHNOLOGIES AS DESCRIBED BY CMS-2020-01-R: HCPCS

## 2022-01-09 LAB — SARS-COV-2 RNA RESP QL NAA+PROBE: NOT DETECTED

## 2022-02-14 ENCOUNTER — OFFICE VISIT (OUTPATIENT)
Dept: FAMILY MEDICINE CLINIC | Facility: CLINIC | Age: 37
End: 2022-02-14
Payer: COMMERCIAL

## 2022-02-14 VITALS
HEIGHT: 64 IN | RESPIRATION RATE: 16 BRPM | OXYGEN SATURATION: 99 % | TEMPERATURE: 98 F | HEART RATE: 64 BPM | BODY MASS INDEX: 37.73 KG/M2 | SYSTOLIC BLOOD PRESSURE: 122 MMHG | DIASTOLIC BLOOD PRESSURE: 72 MMHG | WEIGHT: 221 LBS

## 2022-02-14 DIAGNOSIS — E78.1 HYPERTRIGLYCERIDEMIA WITHOUT HYPERCHOLESTEROLEMIA: ICD-10-CM

## 2022-02-14 DIAGNOSIS — Z00.00 ROUTINE MEDICAL EXAM: Primary | ICD-10-CM

## 2022-02-14 DIAGNOSIS — Z00.00 LABORATORY EXAM ORDERED AS PART OF ROUTINE GENERAL MEDICAL EXAMINATION: ICD-10-CM

## 2022-02-14 DIAGNOSIS — Z71.84 TRAVEL ADVICE ENCOUNTER: ICD-10-CM

## 2022-02-14 DIAGNOSIS — E55.9 VITAMIN D DEFICIENCY: ICD-10-CM

## 2022-02-14 PROCEDURE — 3074F SYST BP LT 130 MM HG: CPT | Performed by: FAMILY MEDICINE

## 2022-02-14 PROCEDURE — 99395 PREV VISIT EST AGE 18-39: CPT | Performed by: FAMILY MEDICINE

## 2022-02-14 PROCEDURE — 3078F DIAST BP <80 MM HG: CPT | Performed by: FAMILY MEDICINE

## 2022-02-14 PROCEDURE — 3008F BODY MASS INDEX DOCD: CPT | Performed by: FAMILY MEDICINE

## 2022-02-14 RX ORDER — SCOLOPAMINE TRANSDERMAL SYSTEM 1 MG/1
1 PATCH, EXTENDED RELEASE TRANSDERMAL
Qty: 10 PATCH | Refills: 0 | Status: SHIPPED | OUTPATIENT
Start: 2022-02-14

## 2022-05-18 ENCOUNTER — VIRTUAL PHONE E/M (OUTPATIENT)
Dept: FAMILY MEDICINE CLINIC | Facility: CLINIC | Age: 37
End: 2022-05-18
Payer: COMMERCIAL

## 2022-05-18 DIAGNOSIS — U07.1 COVID-19: Primary | ICD-10-CM

## 2022-05-18 PROCEDURE — 99442 PHONE E/M BY PHYS 11-20 MIN: CPT | Performed by: FAMILY MEDICINE

## 2022-05-18 NOTE — PROGRESS NOTES
Virtual Telephone Check-In    Katya Babcock verbally consents to a Virtual/Telephone Check-In visit on 05/18/22. Patient has been referred to the [facility] website at [email or web address] to review the yearly Consent to Treat document. Patient understands and accepts financial responsibility for any deductible, co-insurance and/or co-pays associated with this service.     Duration of the service: 15 minutes      Summary of topics discussed:             Stewart Mcgee MD

## 2022-06-15 ENCOUNTER — PATIENT MESSAGE (OUTPATIENT)
Dept: FAMILY MEDICINE CLINIC | Facility: CLINIC | Age: 37
End: 2022-06-15

## 2022-06-15 NOTE — TELEPHONE ENCOUNTER
From: Shagufta Echavarria  To: Washington Beckman MD  Sent: 6/15/2022 8:21 AM CDT  Subject: Russell Monk Dr. Severiano Schiller,    I just recovered from Covid about two weeks ago. My last Covid booster was November 25th. When do you recommend I get my second Covid booster?     Thanks,  [employer]

## 2022-07-02 ENCOUNTER — PATIENT MESSAGE (OUTPATIENT)
Dept: FAMILY MEDICINE CLINIC | Facility: CLINIC | Age: 37
End: 2022-07-02

## 2022-07-05 ENCOUNTER — TELEPHONE (OUTPATIENT)
Dept: FAMILY MEDICINE CLINIC | Facility: CLINIC | Age: 37
End: 2022-07-05

## 2022-07-05 NOTE — TELEPHONE ENCOUNTER
Nahid Cason called and said she is leaving on 7/16/22 for a [employer] and she needs a signed letter on letterhead from a licensed physician with physicians name, address and phone number stating she has recovered from [facility] in the last 90 days and is clear to travel. She seen Dr. Willian Baumann on 5/18/22 and then had a PCR test on 5/20/22. She needs to send paperwork in ASAP because it all has to be cleared before the cruise.

## 2022-07-05 NOTE — TELEPHONE ENCOUNTER
From: Porsche Echavarria  To: Arely Clark MD  Sent: 6/15/2022 8:21 AM CDT  Subject: Derick eFrnandez,    I just recovered from Covid about two weeks ago. My last Covid booster was November 25th. When do you recommend I get my second Covid booster?     Thanks,  Parvin [employer]

## 2022-08-04 ENCOUNTER — WALK IN (OUTPATIENT)
Dept: URGENT CARE | Age: 37
End: 2022-08-04

## 2022-08-04 VITALS
WEIGHT: 220 LBS | HEIGHT: 65 IN | RESPIRATION RATE: 16 BRPM | TEMPERATURE: 98.9 F | HEART RATE: 84 BPM | OXYGEN SATURATION: 100 % | BODY MASS INDEX: 36.65 KG/M2 | DIASTOLIC BLOOD PRESSURE: 80 MMHG | SYSTOLIC BLOOD PRESSURE: 140 MMHG

## 2022-08-04 DIAGNOSIS — H10.9 BACTERIAL CONJUNCTIVITIS: Primary | ICD-10-CM

## 2022-08-04 PROCEDURE — 99202 OFFICE O/P NEW SF 15 MIN: CPT | Performed by: NURSE PRACTITIONER

## 2022-08-04 RX ORDER — CIPROFLOXACIN HYDROCHLORIDE 3.5 MG/ML
1 SOLUTION/ DROPS TOPICAL
Qty: 5 ML | Refills: 0 | Status: SHIPPED | OUTPATIENT
Start: 2022-08-04 | End: 2022-08-11

## 2022-08-04 ASSESSMENT — VISUAL ACUITY
OD_CC: 20 20
OS_CC: 20/20

## 2022-08-04 ASSESSMENT — PAIN SCALES - GENERAL: PAINLEVEL: 0

## 2023-02-03 ENCOUNTER — LAB ENCOUNTER (OUTPATIENT)
Dept: LAB | Age: 38
End: 2023-02-03
Attending: FAMILY MEDICINE
Payer: COMMERCIAL

## 2023-02-03 DIAGNOSIS — E55.9 VITAMIN D DEFICIENCY: ICD-10-CM

## 2023-02-03 DIAGNOSIS — Z00.00 LABORATORY EXAM ORDERED AS PART OF ROUTINE GENERAL MEDICAL EXAMINATION: ICD-10-CM

## 2023-02-03 DIAGNOSIS — E78.1 HYPERTRIGLYCERIDEMIA WITHOUT HYPERCHOLESTEROLEMIA: ICD-10-CM

## 2023-02-03 LAB
ALBUMIN SERPL-MCNC: 3.7 G/DL (ref 3.4–5)
ALBUMIN/GLOB SERPL: 1.1 {RATIO} (ref 1–2)
ALP LIVER SERPL-CCNC: 75 U/L
ALT SERPL-CCNC: 24 U/L
ANION GAP SERPL CALC-SCNC: 4 MMOL/L (ref 0–18)
AST SERPL-CCNC: 20 U/L (ref 15–37)
BASOPHILS # BLD AUTO: 0.05 X10(3) UL (ref 0–0.2)
BASOPHILS NFR BLD AUTO: 0.8 %
BILIRUB SERPL-MCNC: 0.7 MG/DL (ref 0.1–2)
BUN BLD-MCNC: 8 MG/DL (ref 7–18)
CALCIUM BLD-MCNC: 9.4 MG/DL (ref 8.5–10.1)
CHLORIDE SERPL-SCNC: 109 MMOL/L (ref 98–112)
CHOLEST SERPL-MCNC: 185 MG/DL (ref ?–200)
CO2 SERPL-SCNC: 27 MMOL/L (ref 21–32)
CREAT BLD-MCNC: 1.01 MG/DL
EOSINOPHIL # BLD AUTO: 0.08 X10(3) UL (ref 0–0.7)
EOSINOPHIL NFR BLD AUTO: 1.3 %
ERYTHROCYTE [DISTWIDTH] IN BLOOD BY AUTOMATED COUNT: 12.3 %
FASTING PATIENT LIPID ANSWER: YES
FASTING STATUS PATIENT QL REPORTED: YES
GFR SERPLBLD BASED ON 1.73 SQ M-ARVRAT: 73 ML/MIN/1.73M2 (ref 60–?)
GLOBULIN PLAS-MCNC: 3.5 G/DL (ref 2.8–4.4)
GLUCOSE BLD-MCNC: 84 MG/DL (ref 70–99)
HCT VFR BLD AUTO: 42.7 %
HDLC SERPL-MCNC: 49 MG/DL (ref 40–59)
HGB BLD-MCNC: 13.9 G/DL
IMM GRANULOCYTES # BLD AUTO: 0.01 X10(3) UL (ref 0–1)
IMM GRANULOCYTES NFR BLD: 0.2 %
LDLC SERPL CALC-MCNC: 103 MG/DL (ref ?–100)
LYMPHOCYTES # BLD AUTO: 1.79 X10(3) UL (ref 1–4)
LYMPHOCYTES NFR BLD AUTO: 29.2 %
MCH RBC QN AUTO: 29.4 PG (ref 26–34)
MCHC RBC AUTO-ENTMCNC: 32.6 G/DL (ref 31–37)
MCV RBC AUTO: 90.5 FL
MONOCYTES # BLD AUTO: 0.48 X10(3) UL (ref 0.1–1)
MONOCYTES NFR BLD AUTO: 7.8 %
NEUTROPHILS # BLD AUTO: 3.73 X10 (3) UL (ref 1.5–7.7)
NEUTROPHILS # BLD AUTO: 3.73 X10(3) UL (ref 1.5–7.7)
NEUTROPHILS NFR BLD AUTO: 60.7 %
NONHDLC SERPL-MCNC: 136 MG/DL (ref ?–130)
OSMOLALITY SERPL CALC.SUM OF ELEC: 288 MOSM/KG (ref 275–295)
PLATELET # BLD AUTO: 319 10(3)UL (ref 150–450)
POTASSIUM SERPL-SCNC: 4.4 MMOL/L (ref 3.5–5.1)
PROT SERPL-MCNC: 7.2 G/DL (ref 6.4–8.2)
RBC # BLD AUTO: 4.72 X10(6)UL
SODIUM SERPL-SCNC: 140 MMOL/L (ref 136–145)
TRIGL SERPL-MCNC: 192 MG/DL (ref 30–149)
VIT D+METAB SERPL-MCNC: 22.7 NG/ML (ref 30–100)
VLDLC SERPL CALC-MCNC: 32 MG/DL (ref 0–30)
WBC # BLD AUTO: 6.1 X10(3) UL (ref 4–11)

## 2023-02-03 PROCEDURE — 80061 LIPID PANEL: CPT

## 2023-02-03 PROCEDURE — 80053 COMPREHEN METABOLIC PANEL: CPT

## 2023-02-03 PROCEDURE — 85025 COMPLETE CBC W/AUTO DIFF WBC: CPT

## 2023-02-03 PROCEDURE — 82306 VITAMIN D 25 HYDROXY: CPT

## 2023-02-03 PROCEDURE — 36415 COLL VENOUS BLD VENIPUNCTURE: CPT

## 2023-04-24 LAB — AMB EXT HPV: NEGATIVE

## 2023-04-28 ENCOUNTER — OFFICE VISIT (OUTPATIENT)
Dept: FAMILY MEDICINE CLINIC | Facility: CLINIC | Age: 38
End: 2023-04-28
Payer: COMMERCIAL

## 2023-04-28 VITALS
HEART RATE: 60 BPM | OXYGEN SATURATION: 98 % | WEIGHT: 224 LBS | DIASTOLIC BLOOD PRESSURE: 78 MMHG | TEMPERATURE: 98 F | RESPIRATION RATE: 16 BRPM | BODY MASS INDEX: 38.24 KG/M2 | HEIGHT: 64 IN | SYSTOLIC BLOOD PRESSURE: 124 MMHG

## 2023-04-28 DIAGNOSIS — M67.40 GANGLION CYST: ICD-10-CM

## 2023-04-28 DIAGNOSIS — E55.9 VITAMIN D DEFICIENCY: ICD-10-CM

## 2023-04-28 DIAGNOSIS — E78.1 HYPERTRIGLYCERIDEMIA WITHOUT HYPERCHOLESTEROLEMIA: ICD-10-CM

## 2023-04-28 DIAGNOSIS — Z00.00 ROUTINE MEDICAL EXAM: Primary | ICD-10-CM

## 2023-04-28 DIAGNOSIS — Z12.31 ENCOUNTER FOR SCREENING MAMMOGRAM FOR MALIGNANT NEOPLASM OF BREAST: ICD-10-CM

## 2023-04-28 PROCEDURE — 3078F DIAST BP <80 MM HG: CPT | Performed by: FAMILY MEDICINE

## 2023-04-28 PROCEDURE — 3074F SYST BP LT 130 MM HG: CPT | Performed by: FAMILY MEDICINE

## 2023-04-28 PROCEDURE — 3008F BODY MASS INDEX DOCD: CPT | Performed by: FAMILY MEDICINE

## 2023-04-28 PROCEDURE — 99395 PREV VISIT EST AGE 18-39: CPT | Performed by: FAMILY MEDICINE

## 2023-05-03 ENCOUNTER — TELEPHONE (OUTPATIENT)
Dept: FAMILY MEDICINE CLINIC | Facility: CLINIC | Age: 38
End: 2023-05-03

## 2023-05-03 NOTE — TELEPHONE ENCOUNTER
The 4/24/23 Pap Smear & HPV results and the 7/11/22 Bilateral Mammogram report all received via fax from Dr. Heavenly Doan at [facility] in [place]. The full reports placed in dr. Gonzalez Holding in box for review.

## 2024-01-17 ENCOUNTER — TELEPHONE (OUTPATIENT)
Dept: FAMILY MEDICINE CLINIC | Facility: CLINIC | Age: 39
End: 2024-01-17

## 2024-01-17 ENCOUNTER — OFFICE VISIT (OUTPATIENT)
Dept: FAMILY MEDICINE CLINIC | Facility: CLINIC | Age: 39
End: 2024-01-17
Payer: COMMERCIAL

## 2024-01-17 VITALS
HEART RATE: 105 BPM | BODY MASS INDEX: 38 KG/M2 | SYSTOLIC BLOOD PRESSURE: 150 MMHG | DIASTOLIC BLOOD PRESSURE: 98 MMHG | WEIGHT: 223 LBS | TEMPERATURE: 99 F

## 2024-01-17 DIAGNOSIS — J02.0 STREP PHARYNGITIS: ICD-10-CM

## 2024-01-17 DIAGNOSIS — J02.0 STREP PHARYNGITIS: Primary | ICD-10-CM

## 2024-01-17 PROCEDURE — 3080F DIAST BP >= 90 MM HG: CPT | Performed by: FAMILY MEDICINE

## 2024-01-17 PROCEDURE — 3077F SYST BP >= 140 MM HG: CPT | Performed by: FAMILY MEDICINE

## 2024-01-17 PROCEDURE — 99213 OFFICE O/P EST LOW 20 MIN: CPT | Performed by: FAMILY MEDICINE

## 2024-01-17 RX ORDER — PENICILLIN V POTASSIUM 500 MG/1
500 TABLET ORAL 2 TIMES DAILY
COMMUNITY
Start: 2024-01-15 | End: 2024-01-25

## 2024-01-17 RX ORDER — CEPHALEXIN 500 MG/1
500 CAPSULE ORAL 2 TIMES DAILY
Qty: 20 CAPSULE | Refills: 0 | Status: SHIPPED | OUTPATIENT
Start: 2024-01-17 | End: 2024-01-27

## 2024-01-17 NOTE — TELEPHONE ENCOUNTER
Disp Refills Start End    Benadryl/Maalox/Lidocaine 1:1:1 solution 90 mL 0 1/17/2024 --    Sig - Route: Take 5-10 mL by mouth TID AC&HS. Swish and Swallow or Spit - Oral    Sent to pharmacy as: Benadryl/Maalox/Lidocaine 1:1:1 solution    E-Prescribing Status: Receipt confirmed by pharmacy (1/17/2024  2:13 PM CST)        Pharmacy did not receive rx.  Please resend.    [facility] #[id number] - [place], [place] - [address], [phone], [phone]

## 2024-01-17 NOTE — PROGRESS NOTES
[facility] Visit Note  1/17/2024      Subjective:      Patient ID: Faith Echavarria is a 38 year old female.    Chief Complaint:  Chief Complaint   Patient presents with    Strep Throat     Not improving yet       HPI:  Faith Echavarria is a 38 year old female who is being seen today for theabove.      Not improving-  Son sick with strep on Sat and improving on antibiotics. She was seen in prompt care on 1/15. Nausea. Myalgia,clogged ears, mild headache but now gone. Worst is the pain in the throat.   Takng Tylenol.       Neg covid test today.     Denies- sob,chest pain, vomiting, diarrhea, loss of taste/smell      Review of Systems - as stated above in the HPI      Objective:     Vitals:    01/17/24 1356 01/17/24 1416   BP: (!) 168/98 (!) 150/98   BP Location: Right arm Right arm   Patient Position: Sitting Sitting   Pulse: 105    Temp: 98.8 °F (37.1 °C)    Weight: 223 lb (101.2 kg)        Physical Examination   General:  Alert, in no acute distress  HEENT: NCAT, EOMI, normal TMs, + erythema with purulence on tonsil, erythematous oropharynx, normal nasal turbinates.  Neck:  No cervical lymphadenopathy  CV: Regular rate and rhythm. No murmurs, gallops, or rubs.  Lungs:  Clear to auscultation B, no wheezes, rales, or rhonchi, normal respiratory effort  Abd:  +bowel sounds, soft  Ext:  No pedal edema,  Pedal pulses 2+ B        Assessment:     1. Strep pharyngitis  - Benadryl/Maalox/Lidocaine 1:1:1 solution; Take 5-10 mL by mouth TID AC&HS. Swish and Swallow or Spit  Dispense: 90 mL; Refill: 0  - cephalexin 500 MG Oral Cap; Take 1 capsule (500 mg total) by mouth 2 (two) times daily for 10 days.  Dispense: 20 capsule; Refill: 0    -if not improving by tomorrow morning to stop pen vk and start cephalexin  -just needs more time, abx resistence, other additional dx, vs other  -if not improvng by weekend to call office. Would consider labs checking for mono, etc.    Return for if symptoms worsen/don't improve by  end of the week.

## 2024-03-11 ENCOUNTER — PATIENT MESSAGE (OUTPATIENT)
Dept: FAMILY MEDICINE CLINIC | Facility: CLINIC | Age: 39
End: 2024-03-11

## 2024-03-12 NOTE — TELEPHONE ENCOUNTER
From: Faith Echavarria  To: Naomi Gao  Sent: 3/11/2024 9:56 AM CDT  Subject: Puppy Parasite Human Concerns    Hi Dr. Gao,  We just adopted two puppies that I was told were being treated for hookworms. Saturday we noticed that one of the puppies had live roundworm in its stool. We are seeking medical attention for both dogs but I am wondering about my family. Are there any medicines we should take as a preventative measure in case we were infected. We do not have symptoms or concerns at this time but if there is a something we should take out of caution I would be open to it.     Thanks!  Lizzy Echavarria

## 2024-05-30 ENCOUNTER — TELEPHONE (OUTPATIENT)
Dept: FAMILY MEDICINE CLINIC | Facility: CLINIC | Age: 39
End: 2024-05-30

## 2024-05-30 ENCOUNTER — LAB ENCOUNTER (OUTPATIENT)
Dept: LAB | Age: 39
End: 2024-05-30
Attending: FAMILY MEDICINE
Payer: COMMERCIAL

## 2024-05-30 ENCOUNTER — OFFICE VISIT (OUTPATIENT)
Dept: FAMILY MEDICINE CLINIC | Facility: CLINIC | Age: 39
End: 2024-05-30

## 2024-05-30 VITALS
OXYGEN SATURATION: 99 % | WEIGHT: 228 LBS | HEIGHT: 64 IN | HEART RATE: 75 BPM | TEMPERATURE: 98 F | RESPIRATION RATE: 16 BRPM | SYSTOLIC BLOOD PRESSURE: 130 MMHG | DIASTOLIC BLOOD PRESSURE: 80 MMHG | BODY MASS INDEX: 38.93 KG/M2

## 2024-05-30 DIAGNOSIS — M25.50 POLYARTHRALGIA: ICD-10-CM

## 2024-05-30 DIAGNOSIS — E66.09 CLASS 2 OBESITY DUE TO EXCESS CALORIES WITHOUT SERIOUS COMORBIDITY WITH BODY MASS INDEX (BMI) OF 39.0 TO 39.9 IN ADULT: ICD-10-CM

## 2024-05-30 DIAGNOSIS — E55.9 VITAMIN D DEFICIENCY: ICD-10-CM

## 2024-05-30 DIAGNOSIS — Z12.31 ENCOUNTER FOR SCREENING MAMMOGRAM FOR MALIGNANT NEOPLASM OF BREAST: ICD-10-CM

## 2024-05-30 DIAGNOSIS — Z00.00 LABORATORY EXAM ORDERED AS PART OF ROUTINE GENERAL MEDICAL EXAMINATION: ICD-10-CM

## 2024-05-30 DIAGNOSIS — E78.1 HYPERTRIGLYCERIDEMIA WITHOUT HYPERCHOLESTEROLEMIA: ICD-10-CM

## 2024-05-30 DIAGNOSIS — H02.402 PTOSIS OF LEFT EYELID: ICD-10-CM

## 2024-05-30 DIAGNOSIS — Z00.00 ROUTINE MEDICAL EXAM: Primary | ICD-10-CM

## 2024-05-30 DIAGNOSIS — M25.50 POLYARTHRALGIA: Primary | ICD-10-CM

## 2024-05-30 LAB
ALBUMIN SERPL-MCNC: 3.7 G/DL (ref 3.4–5)
ALBUMIN/GLOB SERPL: 0.9 {RATIO} (ref 1–2)
ALP LIVER SERPL-CCNC: 76 U/L
ALT SERPL-CCNC: 24 U/L
ANION GAP SERPL CALC-SCNC: 5 MMOL/L (ref 0–18)
AST SERPL-CCNC: 16 U/L (ref 15–37)
BASOPHILS # BLD AUTO: 0.03 X10(3) UL (ref 0–0.2)
BASOPHILS NFR BLD AUTO: 0.6 %
BILIRUB SERPL-MCNC: 0.8 MG/DL (ref 0.1–2)
BUN BLD-MCNC: 14 MG/DL (ref 9–23)
CALCIUM BLD-MCNC: 8.9 MG/DL (ref 8.5–10.1)
CHLORIDE SERPL-SCNC: 107 MMOL/L (ref 98–112)
CHOLEST SERPL-MCNC: 213 MG/DL (ref ?–200)
CO2 SERPL-SCNC: 28 MMOL/L (ref 21–32)
CREAT BLD-MCNC: 1.03 MG/DL
CRP SERPL-MCNC: 0.6 MG/DL (ref ?–0.3)
EGFRCR SERPLBLD CKD-EPI 2021: 70 ML/MIN/1.73M2 (ref 60–?)
EOSINOPHIL # BLD AUTO: 0.08 X10(3) UL (ref 0–0.7)
EOSINOPHIL NFR BLD AUTO: 1.5 %
ERYTHROCYTE [DISTWIDTH] IN BLOOD BY AUTOMATED COUNT: 12.4 %
ERYTHROCYTE [SEDIMENTATION RATE] IN BLOOD: 32 MM/HR
FASTING PATIENT LIPID ANSWER: YES
FASTING STATUS PATIENT QL REPORTED: YES
GLOBULIN PLAS-MCNC: 4.2 G/DL (ref 2.8–4.4)
GLUCOSE BLD-MCNC: 88 MG/DL (ref 70–99)
HCT VFR BLD AUTO: 42.7 %
HDLC SERPL-MCNC: 57 MG/DL (ref 40–59)
HGB BLD-MCNC: 13.9 G/DL
IMM GRANULOCYTES # BLD AUTO: 0.01 X10(3) UL (ref 0–1)
IMM GRANULOCYTES NFR BLD: 0.2 %
LDLC SERPL CALC-MCNC: 122 MG/DL (ref ?–100)
LYMPHOCYTES # BLD AUTO: 1.5 X10(3) UL (ref 1–4)
LYMPHOCYTES NFR BLD AUTO: 27.9 %
MCH RBC QN AUTO: 29 PG (ref 26–34)
MCHC RBC AUTO-ENTMCNC: 32.6 G/DL (ref 31–37)
MCV RBC AUTO: 89 FL
MONOCYTES # BLD AUTO: 0.4 X10(3) UL (ref 0.1–1)
MONOCYTES NFR BLD AUTO: 7.4 %
NEUTROPHILS # BLD AUTO: 3.36 X10 (3) UL (ref 1.5–7.7)
NEUTROPHILS # BLD AUTO: 3.36 X10(3) UL (ref 1.5–7.7)
NEUTROPHILS NFR BLD AUTO: 62.4 %
NONHDLC SERPL-MCNC: 156 MG/DL (ref ?–130)
OSMOLALITY SERPL CALC.SUM OF ELEC: 290 MOSM/KG (ref 275–295)
PLATELET # BLD AUTO: 335 10(3)UL (ref 150–450)
POTASSIUM SERPL-SCNC: 4.1 MMOL/L (ref 3.5–5.1)
PROT SERPL-MCNC: 7.9 G/DL (ref 6.4–8.2)
RBC # BLD AUTO: 4.8 X10(6)UL
RHEUMATOID FACT SERPL-ACNC: <10 IU/ML (ref ?–15)
SODIUM SERPL-SCNC: 140 MMOL/L (ref 136–145)
T4 FREE SERPL-MCNC: 0.8 NG/DL (ref 0.8–1.7)
TRIGL SERPL-MCNC: 193 MG/DL (ref 30–149)
TSI SER-ACNC: 1.55 MIU/ML (ref 0.36–3.74)
URATE SERPL-MCNC: 5.5 MG/DL
VIT D+METAB SERPL-MCNC: 26 NG/ML (ref 30–100)
VLDLC SERPL CALC-MCNC: 34 MG/DL (ref 0–30)
WBC # BLD AUTO: 5.4 X10(3) UL (ref 4–11)

## 2024-05-30 PROCEDURE — 84443 ASSAY THYROID STIM HORMONE: CPT

## 2024-05-30 PROCEDURE — 86038 ANTINUCLEAR ANTIBODIES: CPT

## 2024-05-30 PROCEDURE — 86225 DNA ANTIBODY NATIVE: CPT

## 2024-05-30 PROCEDURE — 85025 COMPLETE CBC W/AUTO DIFF WBC: CPT

## 2024-05-30 PROCEDURE — 86200 CCP ANTIBODY: CPT

## 2024-05-30 PROCEDURE — 36415 COLL VENOUS BLD VENIPUNCTURE: CPT

## 2024-05-30 PROCEDURE — 99214 OFFICE O/P EST MOD 30 MIN: CPT | Performed by: FAMILY MEDICINE

## 2024-05-30 PROCEDURE — 86431 RHEUMATOID FACTOR QUANT: CPT

## 2024-05-30 PROCEDURE — 3075F SYST BP GE 130 - 139MM HG: CPT | Performed by: FAMILY MEDICINE

## 2024-05-30 PROCEDURE — 99396 PREV VISIT EST AGE 40-64: CPT | Performed by: FAMILY MEDICINE

## 2024-05-30 PROCEDURE — 84439 ASSAY OF FREE THYROXINE: CPT

## 2024-05-30 PROCEDURE — 80061 LIPID PANEL: CPT

## 2024-05-30 PROCEDURE — 86140 C-REACTIVE PROTEIN: CPT

## 2024-05-30 PROCEDURE — 84550 ASSAY OF BLOOD/URIC ACID: CPT

## 2024-05-30 PROCEDURE — 82306 VITAMIN D 25 HYDROXY: CPT

## 2024-05-30 PROCEDURE — 80053 COMPREHEN METABOLIC PANEL: CPT

## 2024-05-30 PROCEDURE — 3079F DIAST BP 80-89 MM HG: CPT | Performed by: FAMILY MEDICINE

## 2024-05-30 PROCEDURE — 3008F BODY MASS INDEX DOCD: CPT | Performed by: FAMILY MEDICINE

## 2024-05-30 PROCEDURE — 85652 RBC SED RATE AUTOMATED: CPT

## 2024-05-30 RX ORDER — TIRZEPATIDE 2.5 MG/.5ML
2.5 INJECTION, SOLUTION SUBCUTANEOUS WEEKLY
Qty: 2 ML | Refills: 0 | Status: SHIPPED | OUTPATIENT
Start: 2024-05-30 | End: 2024-06-21

## 2024-05-30 NOTE — TELEPHONE ENCOUNTER
Received incoming fax from pharmacy on Zepbound, needs PA. Will place fax in MA folder Key:[id number]

## 2024-05-30 NOTE — TELEPHONE ENCOUNTER
Prior authorization submitted & approved via Cover My Meds today for the Zepbound 2.5 mg/0.5 mL.      Diagnosis submitted with today's office visit notes were:  E66.09 - Other obesity due to excess calories  Z68.39 - Body mass index [BMI] 39.0-39.9, adult        Your request has been approved  CaseId:[id number].  Coverage Start Date:04/30/2024  Coverage End Date:01/25/2025.        [facility] pharmacist notified of the approval.

## 2024-05-30 NOTE — PROGRESS NOTES
Chief Complaint   Patient presents with    Physical       HPI:  Faith Echavarria is a 39 year old female here today for preventative visit.      Imms- up-to-date with covid & Tdap.        Cervical cancer screening-  Had a h/o LEEP + HPV and since then paps are all normal. Said her gyne recommended screening 1 q 3 yrs.   ASCUS with neg HPV on 1/29/19 in care everywhere.   2/4/20 Normal co-testing with Gyne- Dr. Dieog in [facility].  4/28/23 co-testing normal with Patricia Diego. Repeat 5 yrs.        Breast cancer screening- 2 maternal half aunts had breast cancer.  Neg test for BRCA-1 & BRCA-2 gene.Due next month for regular screening in July        Colon cancer screening- No family h/o colon cancer.        Dental/eye visits- recommended seeing dentist q 6mo and eye doctor 1/yr.        Diet/exercise- working on this. Exercises well, but eats too much. With her R bunion surgery she gained ~8# and is not coming off. 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 Saw Dr. Gris zayas and repeated imaging and said it resolved. Pt had repeat MRI pituitary done 8/31/18 at [facility] ordered by Dr. Lawson and no evidence of pituitary adenoma see below note. 1st MRI in 2015 said artifactual or subtle pituitary microadenoma.     Outside medical records reviewed and will scan to [facility].  In brief:     12/24/15 MRI pituitary ([facility])  - 2 x 6mm area within the pituitary that may be artifactual or represent a very subtle pituitary microadenoma  - cavernous sinus preserved  - optic chiasm nondisplaced     8/31/18 MRI pituitary ([facility])  - normal MRI of brain with no evidence of pituitary adenoma     Please inform pt that we received her MRI reports from [facility] and her 8/2018 MRI was normal and no longer demonstrates pituitary adenoma.  Given this, her previous MRI abnormality may have been artifact or the microadenoma resolved.  Therefore,  she does not need to get those pituitary labs done and I have cancelled them.  No need for future follow-up in endocrine clinic unless recommended by PCP.  RTC prn.            HyperTG-  on 2/3/23, , rest is normal       Eyelid drooping- L side and has aching of the eye at times. Would like to see an ophthalmologist for further eval.      Joint pain- all over. Hard to exercise with it. Mother has RA. Had a neg rheum panel in 2020.        Past Medical History:    Abnormal Pap smear of cervix    + LEEP, Dr. Diego, [facility], + ASCUS neg HPV on 1/29/19 pap    Bilateral plantar fasciitis    Chemical pregnancy (HCC)    Family history of malignant hyperthermia    mother    Hypertriglyceridemia without hypercholesterolemia    Vitamin D deficiency     Past Surgical History:   Procedure Laterality Date    Bunionectomy Right 01/26/2024    + juliana correction, Dr. Ramos     epidural anesthesia      Leep  2008    Lipoma removal Left 12/17/2020    L axillary mass removal (8x11in), Dr. Lynn Vazquez    [place] teeth removed       Current Outpatient Medications on File Prior to Visit   Medication Sig Dispense Refill    Ergocalciferol (VITAMIN D OR) Take 1 Capful by mouth daily.      Turmeric 500 MG Oral Cap Take 2 capsules by mouth daily.      Omega-3 1000 MG Oral Cap Take 1 capsule by mouth daily.      Vitamin B-12 1000 MCG Oral Tab Take 2 tablets (2,000 mcg total) by mouth daily.       No current facility-administered medications on file prior to visit.     Allergies   Allergen Reactions    Dust Mites ITCHING     Itchy eyes     Social History     Socioeconomic History    Marital status:      Spouse name: Not on file    Number of children: Not on file    Years of education: Not on file    Highest education level: Not on file   Occupational History    Not on file   Tobacco Use    Smoking status: Never    Smokeless tobacco: Never   Vaping Use    Vaping status: Never Used   Substance and Sexual Activity     Alcohol use: Yes     Comment: 1 glass wine/mo, never a problem    Drug use: Never    Sexual activity: Yes     Partners: Male     Birth control/protection: Vasectomy   Other Topics Concern    Caffeine Concern Not Asked    Exercise Not Asked    Seat Belt Not Asked    Special Diet Not Asked    Stress Concern Not Asked    Weight Concern Not Asked   Social History Narrative    Not on file     Social Determinants of Health     Financial Resource Strain: Not on file   Food Insecurity: Not on file   Transportation Needs: Not on file   Physical Activity: Not on file   Stress: Not on file   Social Connections: Not on file   Housing Stability: Not on file     Family History   Problem Relation Age of Onset    Hypertension Mother     High Cholesterol Mother     Malignent Hypothermia Mother     Cancer Mother         multiple myeloma    Diabetes Mother     Other (rheumatoid arthritis) Mother     Hypertension Father     Heart Disease Father         bypass    High Cholesterol Father     Other (COPD) Father     Other (gout) Father     No Known Problems Brother     Other (GI disease) Brother         chrons possibly    No Known Problems Maternal Grandmother     Dementia Maternal Grandfather     Diabetes Maternal Grandfather     No Known Problems Paternal Grandmother     Heart Disease Paternal Grandfather     Breast Cancer Maternal Aunt     Breast Cancer Maternal Aunt        Review of Systems - All systems reviewed and negative except for HPI    PHYSICAL EXAM:  /80   Pulse 75   Temp 98.4 °F (36.9 °C) (Temporal)   Resp 16   Ht 5' 4\" (1.626 m)   Wt 228 lb (103.4 kg)   LMP 05/26/2024 (Exact Date)   SpO2 99%   BMI 39.14 kg/m²   GENERAL APPEARANCE:  Alert, no acute distress, appears stated age  HEENT:  Head- Normocephalic, atraumatic.    Eyes- Extraocular movements intact, L ptosis mild, pupils equally round and reactive to light,  conjunctivae normal.    Ears- Tympanic membranes intact bilaterally.    Nose- Patent, normal  septum and turbinates.    Mouth/Throat- Normal oral mucosa, throat non-erythematous.  NECK:  No submental, submandibular, ant/post cervical lymphadenopathy. No thyromegaly or masses.  PULMONARY:  Lungs clear to auscultation bilaterally. No wheezes, rales, or rhonchi. Normal respiratory effort.  CARDIOVASCULAR:  Regular rate and rhythm. No murmurs, gallops, or rubs.  ABDOMEN:  + bowel sounds, soft, nontender, nondistended. No hepatomegaly.  MUSCULOSKELETAL: Strength of upper and lower extremities 5/5 bilaterally. Normal gait.  NEUROLOGIC:  Cranial nerves 2-12 grossly intact.  PSYCHIATRIC:  Normal mood, affect, and hygiene.   Breast: declines b/c gyne does it    ASSESSMENT/PLAN:    1. Routine medical exam    2. Encounter for screening mammogram for malignant neoplasm of breast  - [facility] NICK 2D+3D SCREENING BILAT (CPT=77067/86897); Future    3. Laboratory exam ordered as part of routine general medical examination  - CBC With Differential With Platelet; Future  - Comp Metabolic Panel (14); Future  - Lipid Panel; Future    4. Vitamin D deficiency  - Vitamin D; Future    5. Hypertriglyceridemia without hypercholesterolemia  - Comp Metabolic Panel (14); Future  - Lipid Panel; Future  - TSH and Free T4; Future    6. Polyarthralgia  - Rheumatoid Arthritis Factor; Future  - Uric Acid, Serum; Future  - Cyclic Citrullinate Peptide (CCP) antibodies; Future  - Sed Rate, Westergren (Automated); Future  - C-Reactive Protein; Future  - [place], Direct, Reflex To 9 Enas ([facility]); Future    7. Ptosis of left eyelid  - Ophthalmology Referral - In Network    8. Class 2 obesity due to excess calories without serious comorbidity with body mass index (BMI) of 39.0 to 39.9 in adult  - Tirzepatide-Weight Management (ZEPBOUND) 2.5 MG/0.5ML Subcutaneous Solution Auto-injector; Inject 2.5 mg into the skin once a week for 4 doses.  Dispense: 2 mL; Refill: 0  -discussed basics but will see if approved first, then will have an appt to go into  depth  -do food and exercise diary      Patient verbalized understanding and agrees to plan.      Return for f/u after surgery to discuss lifestyle changes.

## 2024-05-31 LAB
CCP IGG SERPL-ACNC: 1 U/ML (ref 0–6.9)
DSDNA IGG SERPL IA-ACNC: 0.9 IU/ML
ENA AB SER QL IA: 0.2 UG/L
ENA AB SER QL IA: NEGATIVE

## 2024-06-05 ENCOUNTER — OFFICE VISIT (OUTPATIENT)
Dept: FAMILY MEDICINE CLINIC | Facility: CLINIC | Age: 39
End: 2024-06-05
Payer: COMMERCIAL

## 2024-06-05 VITALS
WEIGHT: 225.81 LBS | OXYGEN SATURATION: 99 % | HEART RATE: 90 BPM | SYSTOLIC BLOOD PRESSURE: 124 MMHG | TEMPERATURE: 98 F | BODY MASS INDEX: 38.55 KG/M2 | RESPIRATION RATE: 16 BRPM | HEIGHT: 64 IN | DIASTOLIC BLOOD PRESSURE: 80 MMHG

## 2024-06-05 DIAGNOSIS — R79.82 CRP ELEVATED: ICD-10-CM

## 2024-06-05 DIAGNOSIS — M25.50 POLYARTHRALGIA: ICD-10-CM

## 2024-06-05 DIAGNOSIS — Z82.61 FAMILY HISTORY OF RHEUMATOID ARTHRITIS: ICD-10-CM

## 2024-06-05 DIAGNOSIS — E55.9 VITAMIN D INSUFFICIENCY: ICD-10-CM

## 2024-06-05 DIAGNOSIS — E66.09 CLASS 2 OBESITY DUE TO EXCESS CALORIES WITHOUT SERIOUS COMORBIDITY WITH BODY MASS INDEX (BMI) OF 38.0 TO 38.9 IN ADULT: Primary | ICD-10-CM

## 2024-06-05 DIAGNOSIS — E78.2 MIXED HYPERLIPIDEMIA: ICD-10-CM

## 2024-06-05 PROBLEM — E78.1 HYPERTRIGLYCERIDEMIA WITHOUT HYPERCHOLESTEROLEMIA: Status: RESOLVED | Noted: 2020-02-01 | Resolved: 2024-06-05

## 2024-06-05 PROCEDURE — 3074F SYST BP LT 130 MM HG: CPT | Performed by: FAMILY MEDICINE

## 2024-06-05 PROCEDURE — 3079F DIAST BP 80-89 MM HG: CPT | Performed by: FAMILY MEDICINE

## 2024-06-05 PROCEDURE — 99214 OFFICE O/P EST MOD 30 MIN: CPT | Performed by: FAMILY MEDICINE

## 2024-06-05 PROCEDURE — 3008F BODY MASS INDEX DOCD: CPT | Performed by: FAMILY MEDICINE

## 2024-06-05 NOTE — ADDENDUM NOTE
Addended by: PRISCILLA BANSAL on: 6/5/2024 08:35 AM     Modules accepted: Orders, Level of Service

## 2024-06-05 NOTE — PROGRESS NOTES
[facility] Visit Note  6/5/2024      Subjective:      Patient ID: Faith Echavarria is a 39 year old female.    Chief Complaint:  Chief Complaint   Patient presents with    Weight Problem     States here for f/u to discuss life stye changes, brought in food log for review as instructed. Will be starting the Zepbound after her upcoming surgery.    Lab Results       HPI:  Faith Echavarria is a 39 year old female who is being seen today for the above.      Lifestyle modifications- will start Zepbound after her L bunion surgery. Here to review food/exercise diary. Tends to be low in fruit/veggies, but does like them. Fast food quite a bit. Doesn't meal plan.      Taking: Zepbound  Started at: 6/5/24, 225#, BMI 38.74  Since Last visit: lost/gained #, months  Total Difference: #, months  Other changes:    Zepbound 2.5 mg 6/24      Joint pain- all over. Hard to exercise with it. Mother has RA. Had a neg rheum panel in 2020.  CRP and ESR were high on 5/30/24, but rest of rheum panel was neg.      Vit d insuff- 26.0 on 5/30/24, taking a supplement sometimes.      HL-  with  on 5/30/24 and higher than last yr.           Review of Systems - as stated above in the HPI      Objective:     Vitals:    06/05/24 0747   BP: 124/80   Pulse: 90   Resp: 16   Temp: 97.9 °F (36.6 °C)   TempSrc: Temporal   SpO2: 99%   Weight: 225 lb 12.8 oz (102.4 kg)   Height: 5' 4\" (1.626 m)       Physical Examination   General:  Alert, in no acute distress  HEENT: NCAT, EOMI, normal TMs, oropharynx, and nasal turbinates.  Neck:  No cervical lymphadenopathy  CV: Regular rate and rhythm. No murmurs, gallops, or rubs.  Lungs:  Clear to auscultation B, no wheezes, rales, or rhonchi, normal respiratory effort  Abd:  +bowel sounds, soft  Ext:  No pedal edema,  Pedal pulses 2+ B        Assessment:     1. Class 2 obesity due to excess calories without serious comorbidity with body mass index (BMI) of 38.0 to 38.9 in adult    2. CRP  elevated  - Rheumatology Referral - In Network    3. Family history of rheumatoid arthritis  - Rheumatology Referral - In Network    4. Polyarthralgia  - Rheumatology Referral - In Network    5. Mixed hyperlipidemia    6. Vitamin D insufficiency  -discussed my plate, reviewed food and exercise log, discussed menu planning, grocery shopping, being realistic, snacks, etc.  -to discuss healthy snack in future      Return for f/u lifestyle changes 4 wks after starting Zepbound.

## 2024-07-01 ENCOUNTER — OFFICE VISIT (OUTPATIENT)
Dept: FAMILY MEDICINE CLINIC | Facility: CLINIC | Age: 39
End: 2024-07-01
Payer: COMMERCIAL

## 2024-07-01 VITALS
RESPIRATION RATE: 16 BRPM | OXYGEN SATURATION: 98 % | TEMPERATURE: 98 F | HEIGHT: 64 IN | BODY MASS INDEX: 37.9 KG/M2 | WEIGHT: 222 LBS | SYSTOLIC BLOOD PRESSURE: 122 MMHG | DIASTOLIC BLOOD PRESSURE: 76 MMHG | HEART RATE: 82 BPM

## 2024-07-01 DIAGNOSIS — R10.33 PERIUMBILICAL PAIN: ICD-10-CM

## 2024-07-01 DIAGNOSIS — Z20.7 EXPOSURE TO PARASITIC DISEASE: ICD-10-CM

## 2024-07-01 DIAGNOSIS — E66.09 CLASS 2 OBESITY DUE TO EXCESS CALORIES WITHOUT SERIOUS COMORBIDITY WITH BODY MASS INDEX (BMI) OF 38.0 TO 38.9 IN ADULT: Primary | ICD-10-CM

## 2024-07-01 PROCEDURE — 3078F DIAST BP <80 MM HG: CPT | Performed by: FAMILY MEDICINE

## 2024-07-01 PROCEDURE — 99214 OFFICE O/P EST MOD 30 MIN: CPT | Performed by: FAMILY MEDICINE

## 2024-07-01 PROCEDURE — 3074F SYST BP LT 130 MM HG: CPT | Performed by: FAMILY MEDICINE

## 2024-07-01 PROCEDURE — 3008F BODY MASS INDEX DOCD: CPT | Performed by: FAMILY MEDICINE

## 2024-07-01 RX ORDER — TIRZEPATIDE 5 MG/.5ML
5 INJECTION, SOLUTION SUBCUTANEOUS WEEKLY
Qty: 2 ML | Refills: 0 | Status: SHIPPED | OUTPATIENT
Start: 2024-07-01 | End: 2024-07-23

## 2024-07-01 RX ORDER — TIRZEPATIDE 2.5 MG/.5ML
2.5 INJECTION, SOLUTION SUBCUTANEOUS WEEKLY
COMMUNITY
End: 2024-07-01 | Stop reason: DRUGHIGH

## 2024-07-01 NOTE — PROGRESS NOTES
[facility] Visit Note  7/1/2024      Subjective:      Patient ID: Faith Echavarria is a 39 year old female.    Chief Complaint:  Chief Complaint   Patient presents with    Weight Check     States she started taking her Zepbound 3 weeks ago on 6/10/24.       HPI:  Faith Echavarria is a 39 year old female who is being seen today for the above.      Lifestyle modifications- started Zepbound after her L bunion surgery on 6/10/24. Food diary tends to be low in fruit/veggies, but does like them. Fast food quite a bit. Doesn't meal plan.  Has to be in the boot for 4-5 more weeks after the bunionectomy, L.  Stools used to be a little loose and now    Denies- constipation, nausea, vomiting.      Taking: Zepbound  Started at: 6/10/24, 225#, BMI 38.74  226.5# start at home, 217.5#on 7/1/24.   Since Last visit: lost 3 #, 1 month   Total Difference: lost 3#, 1 mo     Zepbound:  -2.5 mg 6/10//24  -5mg 7/1/24          Serenity-umbilical pain- Adopted 2 dogs and through up a bowl of spaghetti which was really worms, 3 types of them. Has some pain to the L of the umbilicus. Pain not caused by cough/sneezing, pushing to have a BM. Stools were somewhat soft prior to Zepbound, but now better.        Review of Systems - as stated above in the HPI      Objective:     Vitals:    07/01/24 1406   BP: 122/76   Pulse: 82   Resp: 16   Temp: 98.2 °F (36.8 °C)   TempSrc: Temporal   SpO2: 98%   Weight: 222 lb (100.7 kg)   Height: 5' 4\" (1.626 m)       Physical Examination   General:  Alert, in no acute distress  HEENT: NCAT, EOMI, mucus membranes moist   Neck:  No cervical lymphadenopathy  CV: Regular rate and rhythm. No murmurs, gallops, or rubs.  Lungs:  Clear to auscultation B, no wheezes, rales, or rhonchi, normal respiratory effort  Abd:  +bowel sounds, soft, non-tender, no hepatomegaly, but localizes pain to the L of the umbilicus.  Ext:  No pedal edema,  Pedal pulses 2+ B        Assessment:     1. Class 2 obesity due to excess  calories without serious comorbidity with body mass index (BMI) of 38.0 to 38.9 in adult  - Tirzepatide-Weight Management (ZEPBOUND) 5 MG/0.5ML Subcutaneous Solution Auto-injector; Inject 5 mg into the skin once a week for 4 doses.  Dispense: 2 mL; Refill: 0    2. Periumbilical pain  - Ova and Parasite; Future    3. Exposure to parasitic disease  - Ova and Parasite; Future        Return in about 1 month (around 8/1/2024) for f/u lifestyle modifications (may be virtual).

## 2024-07-02 ENCOUNTER — LAB ENCOUNTER (OUTPATIENT)
Dept: LAB | Age: 39
End: 2024-07-02
Attending: FAMILY MEDICINE
Payer: COMMERCIAL

## 2024-07-02 DIAGNOSIS — R10.33 PERIUMBILICAL PAIN: ICD-10-CM

## 2024-07-02 DIAGNOSIS — Z20.7 EXPOSURE TO PARASITIC DISEASE: ICD-10-CM

## 2024-07-02 LAB
CRYPTOSP AG STL QL IA: NEGATIVE
G LAMBLIA AG STL QL IA: NEGATIVE

## 2024-07-02 PROCEDURE — 87272 CRYPTOSPORIDIUM AG IF: CPT

## 2024-07-02 PROCEDURE — 87329 GIARDIA AG IA: CPT

## 2024-07-26 ENCOUNTER — LAB ENCOUNTER (OUTPATIENT)
Dept: LAB | Age: 39
End: 2024-07-26
Attending: FAMILY MEDICINE
Payer: COMMERCIAL

## 2024-07-26 ENCOUNTER — OFFICE VISIT (OUTPATIENT)
Dept: FAMILY MEDICINE CLINIC | Facility: CLINIC | Age: 39
End: 2024-07-26
Payer: COMMERCIAL

## 2024-07-26 VITALS
OXYGEN SATURATION: 99 % | DIASTOLIC BLOOD PRESSURE: 74 MMHG | SYSTOLIC BLOOD PRESSURE: 120 MMHG | BODY MASS INDEX: 36.54 KG/M2 | HEART RATE: 68 BPM | HEIGHT: 64 IN | TEMPERATURE: 98 F | WEIGHT: 214 LBS | RESPIRATION RATE: 16 BRPM

## 2024-07-26 DIAGNOSIS — E66.09 CLASS 2 OBESITY DUE TO EXCESS CALORIES WITHOUT SERIOUS COMORBIDITY WITH BODY MASS INDEX (BMI) OF 38.0 TO 38.9 IN ADULT: Primary | ICD-10-CM

## 2024-07-26 DIAGNOSIS — Z20.7 EXPOSURE TO PARASITIC DISEASE: ICD-10-CM

## 2024-07-26 PROCEDURE — 87177 OVA AND PARASITES SMEARS: CPT

## 2024-07-26 PROCEDURE — 99214 OFFICE O/P EST MOD 30 MIN: CPT | Performed by: FAMILY MEDICINE

## 2024-07-26 PROCEDURE — 3078F DIAST BP <80 MM HG: CPT | Performed by: FAMILY MEDICINE

## 2024-07-26 PROCEDURE — 3008F BODY MASS INDEX DOCD: CPT | Performed by: FAMILY MEDICINE

## 2024-07-26 PROCEDURE — 87209 SMEAR COMPLEX STAIN: CPT

## 2024-07-26 PROCEDURE — 3074F SYST BP LT 130 MM HG: CPT | Performed by: FAMILY MEDICINE

## 2024-07-26 RX ORDER — TIRZEPATIDE 5 MG/.5ML
5 INJECTION, SOLUTION SUBCUTANEOUS WEEKLY
Qty: 2 ML | Refills: 0 | Status: SHIPPED | OUTPATIENT
Start: 2024-07-26

## 2024-07-26 RX ORDER — TIRZEPATIDE 5 MG/.5ML
5 INJECTION, SOLUTION SUBCUTANEOUS WEEKLY
COMMUNITY
End: 2024-07-26

## 2024-07-26 NOTE — PROGRESS NOTES
[facility] Visit Note  7/26/2024      Subjective:      Patient ID: Faith Echavarria is a 39 year old female.    Chief Complaint:  Chief Complaint   Patient presents with    Weight Check     1 month f/u on the Zepbound, down 8 lbs since last visit.       HPI:  Faith Echavarria is a 39 year old female who is being seen today for the above.      Lifestyle modifications- lost 8# in 1 mo. No side effects. Doesn't feel full, but feels satisfied. Is able to resist temptations easier and think if she really needs something and can make better choices. Ate out of boredom.    History 7/1/24: started Zepbound after her L bunion surgery on 6/10/24. Food diary tends to be low in fruit/veggies, but does like them. Fast food quite a bit. Doesn't meal plan.  Has to be in the boot for 4-5 more weeks after the bunionectomy, L.  Stools used to be a little loose and now     Denies- constipation, nausea, vomiting.      Taking: Zepbound  Started at: 6/10/24, 225#, BMI 38.74  226.5# start at home, 217.5#on 7/1/24.   Since Last visit: lost 8 #, 1 month   Total Difference: lost 11#, 2 mo     Zepbound:  -2.5 mg 6/10//24  -5mg 7/1/24, 7/26/24           Serenity-umbilical pain- was to get ova and parasite testing but lab switched it    History 7/2/24: Adopted 2 dogs and through up a bowl of spaghetti which was really worms, 3 types of them. Has some pain to the L of the umbilicus. Pain not caused by cough/sneezing, pushing to have a BM. Stools were somewhat soft prior to Zepbound, but now better.                  Review of Systems - as stated above in the HPI      Objective:     Vitals:    07/26/24 1134   BP: 120/74   Pulse: 68   Resp: 16   Temp: 98.1 °F (36.7 °C)   TempSrc: Temporal   SpO2: 99%   Weight: 214 lb (97.1 kg)   Height: 5' 4\" (1.626 m)       Physical Examination   General:  Alert, in no acute distress  HEENT: NCAT, EOMI, normal TMs, oropharynx, and nasal turbinates.  Neck:  No cervical lymphadenopathy  CV: Regular rate and  rhythm. No murmurs, gallops, or rubs.  Lungs:  Clear to auscultation B, no wheezes, rales, or rhonchi, normal respiratory effort  Abd:  +bowel sounds, soft  Ext:  No pedal edema,  Pedal pulses 2+ B        Assessment:     1. Class 2 obesity due to excess calories without serious comorbidity with body mass index (BMI) of 38.0 to 38.9 in adult  - Tirzepatide-Weight Management (ZEPBOUND) 5 MG/0.5ML Subcutaneous Solution Auto-injector; Inject 5 mg into the skin once a week.  Dispense: 2 mL; Refill: 0    2. Exposure to parasitic disease  - Ova and Parasite; Future        Return in about 1 month (around 8/26/2024) for lifestyle changes (may be virtual).

## 2024-08-23 ENCOUNTER — TELEMEDICINE (OUTPATIENT)
Dept: FAMILY MEDICINE CLINIC | Facility: CLINIC | Age: 39
End: 2024-08-23
Payer: COMMERCIAL

## 2024-08-23 DIAGNOSIS — E78.2 MIXED HYPERLIPIDEMIA: ICD-10-CM

## 2024-08-23 DIAGNOSIS — E66.09 CLASS 2 OBESITY DUE TO EXCESS CALORIES WITHOUT SERIOUS COMORBIDITY WITH BODY MASS INDEX (BMI) OF 36.0 TO 36.9 IN ADULT: Primary | ICD-10-CM

## 2024-08-23 PROCEDURE — 99214 OFFICE O/P EST MOD 30 MIN: CPT | Performed by: FAMILY MEDICINE

## 2024-08-23 RX ORDER — TIRZEPATIDE 7.5 MG/.5ML
7.5 INJECTION, SOLUTION SUBCUTANEOUS WEEKLY
Qty: 2 ML | Refills: 0 | Status: SHIPPED | OUTPATIENT
Start: 2024-08-23 | End: 2024-09-14

## 2024-08-23 NOTE — PROGRESS NOTES
Video Visit- [facility]  This is a telemedicine visit with live, interactive video and audio.     Faith Echavarria understands and accepts financial responsibility for any deductible, co-insurance and/or co-pays associated with this service for the date of 08/23/24.      Duration of the service: 8 minutes  3:09-3:17PM      Chief Complaint   Patient presents with    Weight Check        HPI:  Lifestyle modifications- no issues with n/v on Zepbound 5mg x 2 mo.   Goal: wanting to get her Tgs down.  Wt-wise: wants ~150#.   Down 4# in the last month. Was 214# on 7/26/24 and now 210# on 8/23/24.    History7/26/24: lost 8# in 1 mo. No side effects. Doesn't feel full, but feels satisfied. Is able to resist temptations easier and think if she really needs something and can make better choices. Ate out of boredom.     History 7/1/24: started Zepbound after her L bunion surgery on 6/10/24. Food diary tends to be low in fruit/veggies, but does like them. Fast food quite a bit. Doesn't meal plan.  Has to be in the boot for 4-5 more weeks after the bunionectomy, L.  Stools used to be a little loose and now     Denies- constipation, nausea, vomiting.      Taking: Zepbound  Started at: 6/10/24, 225#, BMI 38.74  226.5# start at home, 217.5#on 7/1/24.   Since Last visit: lost 4 #, 1 month   Total Difference: lost 15#, 3 mo     Zepbound:  -2.5 mg 6/10//24  -5mg 7/1/24, 7/26/24  -7.5mg 8/23/24          Past Medical History:    Abnormal Pap smear of cervix    + MATTP, Dr. Diego, [facility], + ASCUS neg HPV on 1/29/19 pap    Bilateral plantar fasciitis    Chemical pregnancy (HCC)    Family history of malignant hyperthermia    mother    Mixed hyperlipidemia    Vitamin D deficiency       Past Surgical History:   Procedure Laterality Date    Bunionectomy Right 01/26/2024    + juliana shin, Dr. Ramos     epidural anesthesia      Leep  2008    Lipoma removal Left 12/17/2020    L  axillary mass removal (8x11in), Dr. Lynn Vazquez    [place] teeth removed         Family History   Problem Relation Age of Onset    Hypertension Mother     High Cholesterol Mother     Malignent Hypothermia Mother     Cancer Mother         multiple myeloma    Diabetes Mother     Other (rheumatoid arthritis) Mother     Hypertension Father     Heart Disease Father         bypass    High Cholesterol Father     Other (COPD) Father     Other (gout) Father     No Known Problems Brother     Other (GI disease) Brother         chrons possibly    No Known Problems Maternal Grandmother     Dementia Maternal Grandfather     Diabetes Maternal Grandfather     No Known Problems Paternal Grandmother     Heart Disease Paternal Grandfather     Breast Cancer Maternal Aunt     Breast Cancer Maternal Aunt            Physical Exam:  LMP 07/20/2024 (Exact Date)     GENERAL APPEARANCE:  Alert, no acute distress, appears stated age  HEENT:  NCAT, EOMI, mucus membranes moist  NECK:  No obvious goiter  PULMONARY:  Speaking in full sentences comfortably, normal work of breathing  ABDOMEN:  + not/obese  MUSCULOSKELETAL: Sitting upright.   NEUROLOGIC:  Cranial nerves 2-12 grossly intact.  PSYCHIATRIC:  Normal mood, affect, and hygiene.        Assessment/Plan:  1. Class 2 obesity due to excess calories without serious comorbidity with body mass index (BMI) of 36.0 to 36.9 in adult  - Tirzepatide-Weight Management (ZEPBOUND) 7.5 MG/0.5ML Subcutaneous Solution Auto-injector; Inject 7.5 mg into the skin once a week for 4 doses.  Dispense: 2 mL; Refill: 0    2. Mixed hyperlipidemia  - Tirzepatide-Weight Management (ZEPBOUND) 7.5 MG/0.5ML Subcutaneous Solution Auto-injector; Inject 7.5 mg into the skin once a week for 4 doses.  Dispense: 2 mL; Refill: 0           No follow-ups on file.      Naomi Gao MD      Please note that the following visit was completed using two-way, real-time interactive audio and visual communication. This has been done  in good nichole to provide continuity of care in the best interest of the provider-patient relationship, due to the ongoing public health crisis/national emergency and because of restrictions of visitation. There are limitations of this visit as physical examination was limited.  Appropriate medical decision-making and tests are ordered as detailed in the plan of care above.

## 2024-09-01 ENCOUNTER — MED REC SCAN ONLY (OUTPATIENT)
Dept: FAMILY MEDICINE CLINIC | Facility: CLINIC | Age: 39
End: 2024-09-01

## 2024-09-03 ENCOUNTER — PATIENT MESSAGE (OUTPATIENT)
Dept: FAMILY MEDICINE CLINIC | Facility: CLINIC | Age: 39
End: 2024-09-03

## 2024-09-04 NOTE — TELEPHONE ENCOUNTER
From: Faith Echavarria  To: Naomi Gao  Sent: 9/3/2024 11:28 AM CDT  Subject: 2024 biometric screening wellness program    Hi Dr. Gao,    Can you please fill out this form with my most recent test results and fax it to [phone] once completed? It is required for a Wellness Program through my work.     Thank you,  Lizzy Echavarria

## 2024-09-11 ENCOUNTER — OFFICE VISIT (OUTPATIENT)
Dept: FAMILY MEDICINE CLINIC | Facility: CLINIC | Age: 39
End: 2024-09-11
Payer: COMMERCIAL

## 2024-09-11 VITALS
HEART RATE: 72 BPM | BODY MASS INDEX: 34.49 KG/M2 | WEIGHT: 202 LBS | OXYGEN SATURATION: 98 % | HEIGHT: 64 IN | TEMPERATURE: 98 F | SYSTOLIC BLOOD PRESSURE: 112 MMHG | RESPIRATION RATE: 16 BRPM | DIASTOLIC BLOOD PRESSURE: 72 MMHG

## 2024-09-11 DIAGNOSIS — E78.2 MIXED HYPERLIPIDEMIA: ICD-10-CM

## 2024-09-11 DIAGNOSIS — E66.09 CLASS 1 OBESITY DUE TO EXCESS CALORIES WITHOUT SERIOUS COMORBIDITY WITH BODY MASS INDEX (BMI) OF 34.0 TO 34.9 IN ADULT: Primary | ICD-10-CM

## 2024-09-11 PROBLEM — E66.811 CLASS 1 OBESITY DUE TO EXCESS CALORIES WITHOUT SERIOUS COMORBIDITY WITH BODY MASS INDEX (BMI) OF 34.0 TO 34.9 IN ADULT: Status: ACTIVE | Noted: 2024-09-11

## 2024-09-11 PROCEDURE — 3078F DIAST BP <80 MM HG: CPT | Performed by: FAMILY MEDICINE

## 2024-09-11 PROCEDURE — 3008F BODY MASS INDEX DOCD: CPT | Performed by: FAMILY MEDICINE

## 2024-09-11 PROCEDURE — 99213 OFFICE O/P EST LOW 20 MIN: CPT | Performed by: FAMILY MEDICINE

## 2024-09-11 PROCEDURE — 3074F SYST BP LT 130 MM HG: CPT | Performed by: FAMILY MEDICINE

## 2024-09-11 RX ORDER — TIRZEPATIDE 7.5 MG/.5ML
7.5 INJECTION, SOLUTION SUBCUTANEOUS WEEKLY
Qty: 6 ML | Refills: 0 | Status: SHIPPED | OUTPATIENT
Start: 2024-09-11 | End: 2024-10-03

## 2024-09-11 NOTE — PROGRESS NOTES
[facility] Visit Note  9/11/2024      Subjective:      Patient ID: Faith Echavarria is a 39 year old female.    Chief Complaint:  Chief Complaint   Patient presents with    Weight Check     started Zepbound: 2.5 mg on 6/10//24 then 5mg on 7/1/24 & 7/26/24, then the 7.5mg on 8/23/24.         HPI:  Faith Echavarria is a 39 year old female who is being seen today for the above.      Lifestyle modifications- no issues with n/v on Zepbound 7.5mg x 1mo and wants to cont this dose for now. Working on increasing her protein, but could be better about plant proteins, Is  balance b/c some of the foods worsen her joint pains. Has an appt with rheum. Now walking 3/4 mi to & from work as back to [place] 5d/wk.  Goal: wanting to get her Tgs down.  Wt-wise: wants ~150#.   Down 8# in the last month. Was 210# on 8/23/24, now 202 on 9/11/24     History7/26/24: lost 8# in 1 mo. No side effects. Doesn't feel full, but feels satisfied. Is able to resist temptations easier and think if she really needs something and can make better choices. Ate out of boredom.     History 7/1/24: started Zepbound after her L bunion surgery on 6/10/24. Food diary tends to be low in fruit/veggies, but does like them. Fast food quite a bit. Doesn't meal plan.  Has to be in the boot for 4-5 more weeks after the bunionectomy, L.  Stools used to be a little loose and now     Denies- constipation, nausea, vomiting.      Taking: Zepbound  Started at: 6/10/24, 225#, BMI 38.74  226.5# start at home, 217.5#on 7/1/24.   Since Last visit: lost  8#, 1 month   Total Difference: lost 23#, 3 mo     Zepbound:  -2.5 mg 6/10//24  -5mg 7/1/24, 7/26/24  -7.5mg 8/23/24, 9/11/24 x 3mo                  Review of Systems - as stated above in the HPI      Objective:     Vitals:    09/11/24 0742   BP: 112/72   Pulse: 72   Resp: 16   Temp: 97.9 °F (36.6 °C)   TempSrc: Temporal   SpO2: 98%   Weight: 202 lb (91.6 kg)   Height: 5' 4\" (1.626 m)       Physical  Examination   General:  Alert, in no acute distress  HEENT: NCAT, EOMI, mucus membranes moist   Neck:  No cervical lymphadenopathy  CV: Regular rate and rhythm. No murmurs, gallops, or rubs.  Lungs:  Clear to auscultation B, no wheezes, rales, or rhonchi, normal respiratory effort  Abd:  +bowel sounds, soft  Ext:  No pedal edema,  Pedal pulses 2+ B        Assessment:     1. Class 1 obesity due to excess calories without serious comorbidity with body mass index (BMI) of 34.0 to 34.9 in adult  - Tirzepatide-Weight Management (ZEPBOUND) 7.5 MG/0.5ML Subcutaneous Solution Auto-injector; Inject 7.5 mg into the skin once a week for 4 doses.  Dispense: 6 mL; Refill: 0    2. Mixed hyperlipidemia  - Tirzepatide-Weight Management (ZEPBOUND) 7.5 MG/0.5ML Subcutaneous Solution Auto-injector; Inject 7.5 mg into the skin once a week for 4 doses.  Dispense: 6 mL; Refill: 0  -cont exercise and working on diet      Return in about 3 months (around 12/11/2024) for f/u lifestyle changes.

## 2024-09-21 ENCOUNTER — PATIENT MESSAGE (OUTPATIENT)
Dept: FAMILY MEDICINE CLINIC | Facility: CLINIC | Age: 39
End: 2024-09-21

## 2024-09-23 ENCOUNTER — TELEPHONE (OUTPATIENT)
Dept: FAMILY MEDICINE CLINIC | Facility: CLINIC | Age: 39
End: 2024-09-23

## 2024-09-23 RX ORDER — TIRZEPATIDE 10 MG/.5ML
10 INJECTION, SOLUTION SUBCUTANEOUS WEEKLY
Qty: 2 ML | Refills: 2 | Status: SHIPPED | OUTPATIENT
Start: 2024-09-23 | End: 2024-10-15

## 2024-09-23 NOTE — TELEPHONE ENCOUNTER
From: Faith Echavarria  To: Naomi Gao  Sent: 9/21/2024 10:00 AM CDT  Subject: PRESCRIPTION REVIEW NEEDED    Hi Dr. Gao,    My pharmacy has been saying that the Zepbound 7.5 is not covered under my insurance so I called [facility]. [facility] said the 2.5, 7.5 and 12.5 dosages are only covered 1 time in 365 days. The Zepbound 5, 10, and 15 dosages are considered maintenance and are refillable every 28 days. They said I could titrate up to Zepbound 10 or have you call [employer] to review the 7.5 dosage to see if that would get approved. I think I would be fine going up to Zepbound 10.0 seeing as I did well last month with 7.5. Could you please put in an Rx for Zepbound 10? If you would like to talk to [facility] directly, their number is [phone]. Thank you!

## 2024-09-23 NOTE — TELEPHONE ENCOUNTER
Patient called and said the 7.5mg Zepbound is only covered for 1 month with her insurance.  She said she discussed a larger dose when she was here and she decided she will try the 10mg at this point.  She would like it sent to [facility] #[id number] - [place], [place] - [address] [address], [phone], [phone].

## 2024-09-26 ENCOUNTER — HOSPITAL ENCOUNTER (OUTPATIENT)
Dept: GENERAL RADIOLOGY | Age: 39
Discharge: HOME | End: 2024-09-26
Attending: INTERNAL MEDICINE
Payer: COMMERCIAL

## 2024-09-26 ENCOUNTER — LAB ENCOUNTER (OUTPATIENT)
Dept: LAB | Age: 39
End: 2024-09-26
Attending: INTERNAL MEDICINE
Payer: COMMERCIAL

## 2024-09-26 ENCOUNTER — OFFICE VISIT (OUTPATIENT)
Dept: RHEUMATOLOGY | Facility: CLINIC | Age: 39
End: 2024-09-26
Payer: COMMERCIAL

## 2024-09-26 ENCOUNTER — TELEPHONE (OUTPATIENT)
Dept: RHEUMATOLOGY | Facility: CLINIC | Age: 39
End: 2024-09-26

## 2024-09-26 VITALS
SYSTOLIC BLOOD PRESSURE: 126 MMHG | DIASTOLIC BLOOD PRESSURE: 92 MMHG | WEIGHT: 203.81 LBS | TEMPERATURE: 98 F | HEIGHT: 64 IN | RESPIRATION RATE: 16 BRPM | HEART RATE: 88 BPM | OXYGEN SATURATION: 98 % | BODY MASS INDEX: 34.8 KG/M2

## 2024-09-26 DIAGNOSIS — M25.50 ARTHRALGIA, UNSPECIFIED JOINT: ICD-10-CM

## 2024-09-26 DIAGNOSIS — M25.551 BILATERAL HIP PAIN: ICD-10-CM

## 2024-09-26 DIAGNOSIS — M25.552 BILATERAL HIP PAIN: ICD-10-CM

## 2024-09-26 DIAGNOSIS — G62.9 NEUROPATHY: ICD-10-CM

## 2024-09-26 DIAGNOSIS — M54.9 BACK PAIN, UNSPECIFIED BACK LOCATION, UNSPECIFIED BACK PAIN LATERALITY, UNSPECIFIED CHRONICITY: Primary | ICD-10-CM

## 2024-09-26 DIAGNOSIS — M54.9 BACK PAIN, UNSPECIFIED BACK LOCATION, UNSPECIFIED BACK PAIN LATERALITY, UNSPECIFIED CHRONICITY: ICD-10-CM

## 2024-09-26 DIAGNOSIS — Z84.0 FAMILY HISTORY OF PSORIATIC ARTHRITIS: ICD-10-CM

## 2024-09-26 DIAGNOSIS — R79.82 ELEVATED C-REACTIVE PROTEIN (CRP): ICD-10-CM

## 2024-09-26 DIAGNOSIS — Z82.61 FAMILY HISTORY OF RHEUMATOID ARTHRITIS: ICD-10-CM

## 2024-09-26 DIAGNOSIS — R79.0 LOW FERRITIN: Primary | ICD-10-CM

## 2024-09-26 LAB
CRP SERPL-MCNC: 0.4 MG/DL (ref ?–0.5)
DEPRECATED HBV CORE AB SER IA-ACNC: 40 NG/ML
ERYTHROCYTE [SEDIMENTATION RATE] IN BLOOD: 18 MM/HR
FOLATE SERPL-MCNC: 7.8 NG/ML (ref 5.4–?)
IRON SATN MFR SERPL: 14 %
IRON SERPL-MCNC: 44 UG/DL
TOTAL IRON BINDING CAPACITY: 314 UG/DL (ref 250–425)
TRANSFERRIN SERPL-MCNC: 224 MG/DL (ref 250–380)
URATE SERPL-MCNC: 4.6 MG/DL
VIT B12 SERPL-MCNC: 454 PG/ML (ref 211–911)

## 2024-09-26 PROCEDURE — 82746 ASSAY OF FOLIC ACID SERUM: CPT

## 2024-09-26 PROCEDURE — 73523 X-RAY EXAM HIPS BI 5/> VIEWS: CPT | Performed by: INTERNAL MEDICINE

## 2024-09-26 PROCEDURE — 81374 HLA I TYPING 1 ANTIGEN LR: CPT

## 2024-09-26 PROCEDURE — 83540 ASSAY OF IRON: CPT

## 2024-09-26 PROCEDURE — 83550 IRON BINDING TEST: CPT

## 2024-09-26 PROCEDURE — 3008F BODY MASS INDEX DOCD: CPT | Performed by: INTERNAL MEDICINE

## 2024-09-26 PROCEDURE — 85652 RBC SED RATE AUTOMATED: CPT

## 2024-09-26 PROCEDURE — 3080F DIAST BP >= 90 MM HG: CPT | Performed by: INTERNAL MEDICINE

## 2024-09-26 PROCEDURE — 36415 COLL VENOUS BLD VENIPUNCTURE: CPT

## 2024-09-26 PROCEDURE — 3074F SYST BP LT 130 MM HG: CPT | Performed by: INTERNAL MEDICINE

## 2024-09-26 PROCEDURE — 82728 ASSAY OF FERRITIN: CPT

## 2024-09-26 PROCEDURE — 86140 C-REACTIVE PROTEIN: CPT

## 2024-09-26 PROCEDURE — 82607 VITAMIN B-12: CPT

## 2024-09-26 PROCEDURE — 84550 ASSAY OF BLOOD/URIC ACID: CPT

## 2024-09-26 PROCEDURE — 99244 OFF/OP CNSLTJ NEW/EST MOD 40: CPT | Performed by: INTERNAL MEDICINE

## 2024-09-26 RX ORDER — FERROUS SULFATE 325(65) MG
325 TABLET ORAL
Qty: 90 TABLET | Refills: 3 | Status: SHIPPED | OUTPATIENT
Start: 2024-09-26

## 2024-09-26 NOTE — PATIENT INSTRUCTIONS
Back up plan:   Can you call the office of Dr. Maldonado/Feliciano or [place] [facility] (Dr. Goldberg) to see if they can see you sooner?    Dr. Brand (physiatry)  [address] [id number] [phone]    Dr. Goldberg (neurology, [place])  [address] [address]\

## 2024-09-26 NOTE — PROGRESS NOTES
Rheumatology New Patient Note  =====================================================================================================    Date of visit: 9/26/2024  ?  Chief complaint: elevated CRP/sed rate, arthralgia  Chief Complaint   Patient presents with    New Patient     Referring (will send letter)  PCP  Naomi Gao MD  Fax: [phone]  Phone: [phone]    =====================================================================================================  HPI    Faith Echavarria is a 39 year old female     Here for a mildly elevated CRP/sed rate  Patient is daughter of Mercedes Pete that I see for RA.   Joint pain x 8 years.  This is intermittent in the elbows/hands, hips, ankles.  -Chronic neuropathy and decreased sensation between the first and second toes.  Foot surgery in Jan and June 2024.  Completed fusion/bunions.  Neuropathy not any better.  Neuropathy is unilateral.  -Today the lateral hips are painful.  Does have some mid back soreness.  Changed diet to vegan diet at times, less severe.   On zepbound.  Has lost 25 pounds.    Mother's half-sister with psoriatic arthritis     5/2024  RF/CCP, LLOYD neg    Lab Results   Component Value Date    URIC 5.5 05/30/2024    URIC 4.9 02/04/2020       Lab Results   Component Value Date    ESRML 32 (H) 05/30/2024    ESRML 13 02/04/2020    CRP 0.60 (H) 05/30/2024    CRP <0.29 02/04/2020      Wt Readings from Last 6 Encounters:   09/26/24 203 lb 12.8 oz (92.4 kg)   09/11/24 202 lb (91.6 kg)   07/26/24 214 lb (97.1 kg)   07/01/24 222 lb (100.7 kg)   06/05/24 225 lb 12.8 oz (102.4 kg)   05/30/24 228 lb (103.4 kg)         14 point ROS negative except noted above    Medications:  Current Outpatient Medications on File Prior to Visit   Medication Sig Dispense Refill    Tirzepatide-Weight Management (ZEPBOUND) 10 MG/0.5ML Subcutaneous Solution Auto-injector Inject 10 mg into the skin once a week for 4 doses. 2 mL 2    Ergocalciferol (VITAMIN D OR) Take 1  Capful by mouth daily.      Turmeric 500 MG Oral Cap Take 2 capsules by mouth daily.      Omega-3 1000 MG Oral Cap Take 1 capsule by mouth daily.      Vitamin B-12 1000 MCG Oral Tab Take 2 tablets (2,000 mcg total) by mouth daily.       No current facility-administered medications on file prior to visit.       Past Medical History:  Past Medical History:    Abnormal Pap smear of cervix    + LEEP, Dr. Diego, [facility], + ASCUS neg HPV on 1/29/19 pap    Bilateral plantar fasciitis    Chemical pregnancy (HCC)    Family history of malignant hyperthermia    mother    Mixed hyperlipidemia    Vitamin D deficiency     Past Surgical History:  Past Surgical History:   Procedure Laterality Date    Bunionectomy Right 01/26/2024    + hammertoe correction, Dr. Ramos     epidural anesthesia      [place]  2008    Lipoma removal Left 12/17/2020    L axillary mass removal (8x11in), Dr. Lynn Vazquez    [place] teeth removed       Family History:  Family History   Problem Relation Age of Onset    Hypertension Mother     High Cholesterol Mother     Malignent Hypothermia Mother     Cancer Mother         multiple myeloma    Diabetes Mother     Other (rheumatoid arthritis) Mother     Hypertension Father     Heart Disease Father         bypass    High Cholesterol Father     Other (COPD) Father     Other (gout) Father     No Known Problems Brother     Other (GI disease) Brother         chrons possibly    No Known Problems Maternal Grandmother     Dementia Maternal Grandfather     Diabetes Maternal Grandfather     No Known Problems Paternal Grandmother     Heart Disease Paternal Grandfather     Breast Cancer Maternal Aunt     Breast Cancer Maternal Aunt      Social History:  Social History     Socioeconomic History    Marital status:    Tobacco Use    Smoking status: Never    Smokeless tobacco: Never   Vaping Use    Vaping status: Never Used   Substance and Sexual Activity    Alcohol use: Yes     Comment: 1 glass wine/mo, never a  problem    Drug use: Never    Sexual activity: Yes     Partners: Male     Birth control/protection: Vasectomy     ?  Allergies:  Allergies   Allergen Reactions    Dust Mites ITCHING     Itchy eyes         Objective    Vitals:    09/26/24 1448   BP: (!) 126/92   Pulse: 88   Resp: 16   Temp: 97.9 °F (36.6 °C)   SpO2: 98%   Weight: 203 lb 12.8 oz (92.4 kg)   Height: 5' 4\" (1.626 m)       GEN: NAD, well-nourished.   HEENT: Head: NCAT. Face: No lesions. Eyes: Conjunctiva clear. Sclera are anicteric. PERRLA. EOMs are full. Ears: The right and left ear canals are clear.  Nose: No external or internal nasal deformities. Nasal septum is midline. Mouth: The lips are within normal limits.  No oral ulcers Tongue is midline with no lesions. The oral cavity is clear.   Neck: Supple. No neck masses. No thyromegaly.  PULM: easy effort  Extremities: No cyanosis, edema or deformities.   Neurologic: Strength, CN2-12 grossly intact   Psych: normal affect.   Skin: No lesions or rashes.  MSK: 28 joint count performed. No evidence of synovitis in mcp, pip, dip, wrist, elbows, shoulders, hips, knees, ankles, mtp unless otherwise noted. Full ROM of elbows, wrists, knees.     Tender to palpation in the bilateral greater trochanteric bursa, bilateral logroll of the hips elicits lateral hip pain but not groin pain  -Postsurgical changes overlying the right first and third toes on the dorsum.  Postsurgical changes overlying the left first MTP  -No peripheral joint synovitis or other enthesopathy appreciated.    Labs:  Lab Results   Component Value Date    WBC 5.4 05/30/2024    RBC 4.80 05/30/2024    HGB 13.9 05/30/2024    HCT 42.7 05/30/2024    .0 05/30/2024    MCV 89.0 05/30/2024    MCH 29.0 05/30/2024    MCHC 32.6 05/30/2024    RDW 12.4 05/30/2024    NEPRELIM 3.36 05/30/2024    NEPERCENT 62.4 05/30/2024    LYPERCENT 27.9 05/30/2024    MOPERCENT 7.4 05/30/2024    EOPERCENT 1.5 05/30/2024    BAPERCENT 0.6 05/30/2024    NE 3.36 05/30/2024     LYMABS 1.50 05/30/2024    MOABSO 0.40 05/30/2024    EOABSO 0.08 05/30/2024    BAABSO 0.03 05/30/2024     Lab Results   Component Value Date    GLU 88 05/30/2024    BUN 14 05/30/2024    BUNCREA 11.6 02/04/2021    CREATSERUM 1.03 (H) 05/30/2024    ANIONGAP 5 05/30/2024    GFRNAA 87 02/04/2021    GFRAA 100 02/04/2021    CA 8.9 05/30/2024    OSMOCALC 290 05/30/2024    ALKPHO 76 05/30/2024    AST 16 05/30/2024    ALT 24 05/30/2024    BILT 0.8 05/30/2024    TP 7.9 05/30/2024    ALB 3.7 05/30/2024    GLOBULIN 4.2 05/30/2024     05/30/2024    K 4.1 05/30/2024     05/30/2024    CO2 28.0 05/30/2024         Lab Results   Component Value Date    ANAS Negative 02/04/2020     No results found for: \"SSA\", \"SSAUR\", \"ANTISSA\", \"SSA52\", \"SSA60\", \"SSADD\", \"SSB\", \"ANTISSB\"  No results found for: \"DSDNA\", \"ANTIDSDNA\", \"SMUD\", \"ANTISM\", \"SM\", \"RNP\", \"ANTIRNP\", \"SMITHRNP\"  No results found for: \"SCL70\", \"SCL\", \"CNJKMUV59\"  No results found for: \"C3\", \"C4\"  No results found for: \"DRVVT\", \"LAINT\", \"PTTLUPUS\", \"LUPUSINTERP\", \"LA\", \"P1IK5WKVAR\", \"U0JR0WXJSA\", \"A1YAYNRUMV\", \"L1XQIGMVKJ\"  No results found for: \"CARDIOLIPIGG\", \"CARDIOLIPIGM\", \"CARDIOLIPIGA\", \"CARDIOIGA\", \"CARLIP\"      Additional Labs:    Radiology:    Radiology review:      =====================================================================================================  Assessment and Plan    Assessment:  1. Back pain, unspecified back location, unspecified back pain laterality, unspecified chronicity    2. Neuropathy    3. Elevated C-reactive protein (CRP)    4. Bilateral hip pain    5. Arthralgia, unspecified joint    6. Family history of rheumatoid arthritis    7. Family history of psoriatic arthritis      Chronic polyarthralgia of multiple sites including hands/wrists/elbows/hips/back/ankles/feet.  Today I can only elicit lateral hip pain which is consistent with bilateral greater trochanteric bursitis.  Patient has hyperextensibility of the bilateral  knees with mild valgus deformities that I suspect could be contributing to her hip issues.  Chronic low back pain but none today; this has improved with core strengthening the past.  -Mother with RA, mother's half sister with psoriatic arthritis  -Mild elevations in CRP but clinically significant elevations are greater than 1.0 mg/dL.  Slight elevation likely due to cytokine release from excess adipose tissue    Chronic neuropathy between the first and second toe of the right foot of unclear etiology.  Bunion surgery did not help with this issue.  ?  Plan:  -EMG/NCS of the right lower extremity to adjudicate chronic neuropathy  -Repeat inflammatory markers  -Complete neuropathic pain workup including B12/folate  -Chronic arthralgia: Iron studies  -X-ray of the bilateral hips to evaluate for hip OA, SI joint arthritis.  Add on HLA-B27    Rtc prn and after the above    Diagnoses and all orders for this visit:    Back pain, unspecified back location, unspecified back pain laterality, unspecified chronicity  -     EMG/NCV; Future  -     Sed Rate, Westergren (Automated); Future  -     Uric Acid; Future  -     C-Reactive Protein; Future  -     B12 AND FOLATE; Future  -     Iron And Tibc; Future  -     Ferritin; Future  -     XR HIP W OR WO PELVIS 3 OR 4 VIEWS, BILAT(CPT=73522); Future  -     HLA B 27 Disease Association; Future    Neuropathy  -     EMG/NCV; Future  -     Sed Rate, Westergren (Automated); Future  -     Uric Acid; Future  -     C-Reactive Protein; Future  -     B12 AND FOLATE; Future  -     Iron And Tibc; Future  -     Ferritin; Future  -     XR HIP W OR WO PELVIS 3 OR 4 VIEWS, BILAT(CPT=73522); Future    Elevated C-reactive protein (CRP)  -     HLA B 27 Disease Association; Future    Bilateral hip pain  -     EMG/NCV; Future  -     Sed Rate, Westergren (Automated); Future  -     Uric Acid; Future  -     C-Reactive Protein; Future  -     B12 AND FOLATE; Future  -     Iron And Tibc; Future  -     Ferritin;  Future  -     XR HIP W OR WO PELVIS 3 OR 4 VIEWS, BILAT(CPT=73522); Future  -     HLA B 27 Disease Association; Future    Arthralgia, unspecified joint  -     EMG/NCV; Future  -     Sed Rate, Westergren (Automated); Future  -     Uric Acid; Future  -     C-Reactive Protein; Future  -     B12 AND FOLATE; Future  -     Iron And Tibc; Future  -     Ferritin; Future  -     XR HIP W OR WO PELVIS 3 OR 4 VIEWS, BILAT(CPT=73522); Future    Family history of rheumatoid arthritis    Family history of psoriatic arthritis        No follow-ups on file.      The above plan of care, diagnosis, orders, and follow-up were discussed with the patient. Questions related to this recommended plan of care were answered.    Thank you for referring this delightful patient to me. Please feel free to contact me with any questions.     This report was performed utilizing speech recognition software technology. Despite proofreading, speech recognition errors could escape detection. If a word or phrase is confusing or out of context, please do not hesitate to call for   clarification.       Kind regards      Agusto Rosen MD  [facility]

## 2024-10-01 LAB
HLA B27 SCREENING: NEGATIVE
HLA B27 SCREENING: NEGATIVE

## 2024-10-10 RX ORDER — TIRZEPATIDE 10 MG/.5ML
10 INJECTION, SOLUTION SUBCUTANEOUS WEEKLY
Qty: 2 ML | Refills: 2 | OUTPATIENT
Start: 2024-10-10 | End: 2024-11-01

## 2024-10-24 ENCOUNTER — TELEPHONE (OUTPATIENT)
Dept: FAMILY MEDICINE CLINIC | Facility: CLINIC | Age: 39
End: 2024-10-24

## 2024-10-24 ENCOUNTER — PATIENT MESSAGE (OUTPATIENT)
Dept: FAMILY MEDICINE CLINIC | Facility: CLINIC | Age: 39
End: 2024-10-24

## 2024-10-24 NOTE — TELEPHONE ENCOUNTER
Patient called and said she was here this morning and she was told by her Employee [employer]. She is going to need a letter of medical accommodations.  The letter has to provide what accommodations she needs and also why she is requesting the accommodations.

## 2024-10-25 ENCOUNTER — TELEPHONE (OUTPATIENT)
Dept: FAMILY MEDICINE CLINIC | Facility: CLINIC | Age: 39
End: 2024-10-25

## 2024-10-25 NOTE — TELEPHONE ENCOUNTER
Patient requesting status on letter for medical accomodation.     Please advise.   Patient needs letter as soon as possible.

## 2024-11-04 NOTE — PROGRESS NOTES
[facility] Visit Note  11/4/2024      Subjective:      Patient ID: Faith Echavarria is a 39 year old female.    Chief Complaint:  Chief Complaint   Patient presents with    Complete Form     Medical Accommodation for work       HPI:  Faith Echavarria is a 39 year old female who is being seen today for the above.        Anxiety/panic attack  -Here to fill out a specific form from  regarding her work and potential accomodations. Can't work from [facility] as it is in [place] and no public transportation to there. Could still work from [place] or work remotely.    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 Went to therapy for it in 2020 as well. Using her coping skills to do so.  Was able to work from home 2d/wk, then changed to 5d/wk.   Now taking public transportation so helped with the anxiety on the road (2hr each way), but didn't help the life balance issue.      Had her foot surgeries and gave her some time at home/break from the commute/stress of driving.  Stopped seeing her therapist for ~1 yr and reached out to her recently. Not interested in medications at this time. Feels it is situational and making a change/accommodations will help. Asking to work remotely. Is looking for other jobs, but needs to continue working until then.             Lifestyle modifications- no issues with n/v on Zepbound 10mg since Sept (2mo) and wants to cont this dose for now. Would like to consider a 3 mo script through mail in pharmacy in Jan. Lost 10# in 2mo    . Working on increasing her protein, but could be better about plant proteins, Is  balance b/c some of the foods worsen her joint pains. Has an appt with rheum. Now walking 3/4 mi to & from work as back to [place] 5d/wk.  Goal: wanting to get her Tgs down.  Wt-wise: wants ~150#.   Down 8# in the last month. Was 210# on 8/23/24, now 202 on 9/11/24     History7/26/24: lost 8# in 1 mo. No side effects. Doesn't feel  full, but feels satisfied. Is able to resist temptations easier and think if she really needs something and can make better choices. Ate out of boredom.     History 7/1/24: started Zepbound after her L bunion surgery on 6/10/24. Food diary tends to be low in fruit/veggies, but does like them. Fast food quite a bit. Doesn't meal plan.  Has to be in the boot for 4-5 more weeks after the bunionectomy, L.  Stools used to be a little loose and now     Denies- constipation, nausea, vomiting.      Taking: Zepbound  Started at: 6/10/24, 225#, BMI 38.74  226.5# start at home, 217.5#on 7/1/24.   Since Last visit: lost  10#, 2 months  Total Difference: lost 33#, 5 mo     Zepbound:  -2.5 mg 6/10//24  -5mg 7/1/24, 7/26/24  -7.5mg 8/23/24  -10mg 9/11/24, oct, nov, dec      Review of Systems - as stated above in the HPI      Objective:     Vitals:    11/04/24 1357   BP: 124/74   Pulse: 68   Resp: 16   Temp: 98 °F (36.7 °C)   TempSrc: Temporal   SpO2: 98%   Weight: 192 lb (87.1 kg)   Height: 5' 4\" (1.626 m)       Physical Examination   General:  Alert, in no acute distress  HEENT: NCAT, EOMI, normal TMs, oropharynx, and nasal turbinates.  Neck:  No cervical lymphadenopathy  CV: Regular rate and rhythm. No murmurs, gallops, or rubs.  Lungs:  Clear to auscultation B, no wheezes, rales, or rhonchi, normal respiratory effort  Abd:  +bowel sounds, soft  Ext:  No pedal edema,  Pedal pulses 2+ B        Assessment:     1. Other specified phobia  2. Panic disorder  -reaching out to psychologist  -form regarding work accommodations completed today. Original given to pt and copy sent to scan.    3. Class 1 obesity due to excess calories without serious comorbidity with body mass index (BMI) of 34.0 to 34.9 in adult  - Tirzepatide-Weight Management (ZEPBOUND) 10 MG/0.5ML Subcutaneous Solution Auto-injector; Inject 10 mg into the skin once a week for 4 doses.  Dispense: 2 mL; Refill: 1        Return for f/u weight loss management in 3 months  (Jan/Feb).

## 2024-11-07 ENCOUNTER — PROCEDURE VISIT (OUTPATIENT)
Dept: NEUROLOGY | Facility: CLINIC | Age: 39
End: 2024-11-07
Payer: COMMERCIAL

## 2024-11-07 DIAGNOSIS — R20.0 NUMBNESS OF TOES: Primary | ICD-10-CM

## 2024-11-07 PROCEDURE — 95886 MUSC TEST DONE W/N TEST COMP: CPT | Performed by: OTHER

## 2024-11-07 PROCEDURE — 95908 NRV CNDJ TST 3-4 STUDIES: CPT | Performed by: OTHER

## 2024-11-07 NOTE — PROCEDURES
[address] [address]        Full Name: Faith Echavarria Gender: Female  Patient ID: [id number] YOB: 1985      Visit Date: 11/7/2024 10:06 AM  Age: 39 Years  Examining Physician: Dr. Abner Bellamy  Referring Physician: Adrián Puente      Sensory NCS      Nerve / Sites Rec. Site Onset Lat Peak Lat NP Amp PP Amp Segments Distance Velocity Comment     ms ms µV µV  cm m/s    R Sural - (Antidromic)      Calf Ankle 2.19 2.86 4.7 7.8 Calf - Ankle 14 64        Motor NCS      Nerve / Sites Muscle Latency Amplitude Segments Dist. Lat Diff Velocity Comments     ms mV  cm ms m/s    R Peroneal - EDB      Ankle EDB 5.67 5.3 Ankle -          B. Fib Head EDB 11.10 9.8 B. Fib Head - Ankle 24 5.44 44.1    R Tibial - AH      Ankle AH 4.27 11.8 Ankle - AH 8         Knee AH 11.38 8.2 Knee - Ankle 31 7.10 43.6        EMG Summary Table     Spontaneous MUAP Recruitment   Muscle IA Fib PSW Fasc H.F. Amp Dur. PPP Pattern   R. Tibialis anterior N None None None None N N N N   R. Gastrocnemius (Medial head) N None None None None N N N N   R. Vastus medialis N None None None None N N N N   R. Lumbar paraspinals (low) N None None None None       R. Lumbar paraspinals (mid) N None None None None           Summary    The motor conduction test was normal in all 2 of the tested nerves: R Peroneal - EDB, R Tibial - AH.    The sensory conduction test was normal in all 1 of the tested nerves: R Sural - (Antidromic).    The needle EMG study was normal in all 5 tested muscles: R. Tibialis anterior, R. Gastrocnemius (Medial head), R. Vastus medialis, R. Lumbar paraspinals (low), R. Lumbar paraspinals (mid).        Faith Echavarria [id number] 11/7/2024 10:06 AM     3 of 3    Conclusion:   This is a normal study.  There is no electrodiagnostic evidence of a right lower extremity large fiber neuropathy or a right lumbar radiculopathy at this time.      ________________________    Abner Bellamy  Neurology    Faith Echavarria [id number] 11/7/2024 10:06 AM     3 of 3

## 2024-11-20 DIAGNOSIS — E66.09 CLASS 1 OBESITY DUE TO EXCESS CALORIES WITHOUT SERIOUS COMORBIDITY WITH BODY MASS INDEX (BMI) OF 34.0 TO 34.9 IN ADULT: Primary | ICD-10-CM

## 2024-11-20 DIAGNOSIS — E66.811 CLASS 1 OBESITY DUE TO EXCESS CALORIES WITHOUT SERIOUS COMORBIDITY WITH BODY MASS INDEX (BMI) OF 34.0 TO 34.9 IN ADULT: Primary | ICD-10-CM

## 2024-11-26 RX ORDER — TIRZEPATIDE 10 MG/.5ML
10 INJECTION, SOLUTION SUBCUTANEOUS WEEKLY
Qty: 2 ML | Refills: 0 | Status: SHIPPED | OUTPATIENT
Start: 2024-11-26 | End: 2024-12-18

## 2024-11-26 NOTE — TELEPHONE ENCOUNTER
Requesting Zepbound 10mg  Last OV: 11/4/24  RTC: Jan/Feb  Last Rx'd 11/4/24 #2mL with 0 refills    No future appointments.    Non-protocol med:  Rx pended and routed for approval/denial

## 2024-12-12 DIAGNOSIS — E66.09 CLASS 1 OBESITY DUE TO EXCESS CALORIES WITHOUT SERIOUS COMORBIDITY WITH BODY MASS INDEX (BMI) OF 34.0 TO 34.9 IN ADULT: ICD-10-CM

## 2024-12-12 DIAGNOSIS — E66.811 CLASS 1 OBESITY DUE TO EXCESS CALORIES WITHOUT SERIOUS COMORBIDITY WITH BODY MASS INDEX (BMI) OF 34.0 TO 34.9 IN ADULT: ICD-10-CM

## 2024-12-13 RX ORDER — TIRZEPATIDE 10 MG/.5ML
10 INJECTION, SOLUTION SUBCUTANEOUS WEEKLY
Qty: 2 ML | Refills: 0 | OUTPATIENT
Start: 2024-12-13 | End: 2025-01-04

## 2024-12-13 NOTE — TELEPHONE ENCOUNTER
Pt requesting Rx be sent to new pharmacy because [facility] is closing.    Per [place] , Rx was dispensed on 12/11/24 at [facility] in [facility]    Duplicate request

## 2024-12-17 ENCOUNTER — LAB ENCOUNTER (OUTPATIENT)
Dept: LAB | Age: 39
End: 2024-12-17
Attending: FAMILY MEDICINE
Payer: COMMERCIAL

## 2024-12-17 ENCOUNTER — HOSPITAL ENCOUNTER (OUTPATIENT)
Dept: GENERAL RADIOLOGY | Age: 39
Discharge: HOME | End: 2024-12-17
Attending: FAMILY MEDICINE
Payer: COMMERCIAL

## 2024-12-17 ENCOUNTER — OFFICE VISIT (OUTPATIENT)
Dept: FAMILY MEDICINE CLINIC | Facility: CLINIC | Age: 39
End: 2024-12-17
Payer: COMMERCIAL

## 2024-12-17 VITALS
OXYGEN SATURATION: 99 % | HEIGHT: 64 IN | WEIGHT: 188 LBS | HEART RATE: 74 BPM | DIASTOLIC BLOOD PRESSURE: 74 MMHG | TEMPERATURE: 98 F | RESPIRATION RATE: 16 BRPM | SYSTOLIC BLOOD PRESSURE: 124 MMHG | BODY MASS INDEX: 32.1 KG/M2

## 2024-12-17 DIAGNOSIS — R10.13 EPIGASTRIC PAIN: ICD-10-CM

## 2024-12-17 DIAGNOSIS — E61.1 IRON DEFICIENCY: ICD-10-CM

## 2024-12-17 DIAGNOSIS — R10.13 EPIGASTRIC PAIN: Primary | ICD-10-CM

## 2024-12-17 LAB
ALBUMIN SERPL-MCNC: 4.3 G/DL (ref 3.2–4.8)
ALBUMIN/GLOB SERPL: 1.5 {RATIO} (ref 1–2)
ALP LIVER SERPL-CCNC: 65 U/L
ALT SERPL-CCNC: 10 U/L
ANION GAP SERPL CALC-SCNC: 5 MMOL/L (ref 0–18)
AST SERPL-CCNC: 18 U/L (ref ?–34)
BASOPHILS # BLD AUTO: 0.03 X10(3) UL (ref 0–0.2)
BASOPHILS NFR BLD AUTO: 0.4 %
BILIRUB SERPL-MCNC: 0.7 MG/DL (ref 0.3–1.2)
BUN BLD-MCNC: 12 MG/DL (ref 9–23)
CALCIUM BLD-MCNC: 9.8 MG/DL (ref 8.7–10.4)
CHLORIDE SERPL-SCNC: 109 MMOL/L (ref 98–112)
CO2 SERPL-SCNC: 28 MMOL/L (ref 21–32)
CREAT BLD-MCNC: 0.94 MG/DL
DEPRECATED HBV CORE AB SER IA-ACNC: 39 NG/ML
EGFRCR SERPLBLD CKD-EPI 2021: 78 ML/MIN/1.73M2 (ref 60–?)
EOSINOPHIL # BLD AUTO: 0.1 X10(3) UL (ref 0–0.7)
EOSINOPHIL NFR BLD AUTO: 1.2 %
ERYTHROCYTE [DISTWIDTH] IN BLOOD BY AUTOMATED COUNT: 12.3 %
FASTING STATUS PATIENT QL REPORTED: YES
GLOBULIN PLAS-MCNC: 2.9 G/DL (ref 2–3.5)
GLUCOSE BLD-MCNC: 72 MG/DL (ref 70–99)
HCT VFR BLD AUTO: 40.4 %
HGB BLD-MCNC: 13.5 G/DL
IMM GRANULOCYTES # BLD AUTO: 0.03 X10(3) UL (ref 0–1)
IMM GRANULOCYTES NFR BLD: 0.4 %
IRON SATN MFR SERPL: 14 %
IRON SERPL-MCNC: 40 UG/DL
LIPASE SERPL-CCNC: 34 U/L (ref 12–53)
LYMPHOCYTES # BLD AUTO: 2.08 X10(3) UL (ref 1–4)
LYMPHOCYTES NFR BLD AUTO: 25.9 %
MCH RBC QN AUTO: 29.9 PG (ref 26–34)
MCHC RBC AUTO-ENTMCNC: 33.4 G/DL (ref 31–37)
MCV RBC AUTO: 89.4 FL
MONOCYTES # BLD AUTO: 0.54 X10(3) UL (ref 0.1–1)
MONOCYTES NFR BLD AUTO: 6.7 %
NEUTROPHILS # BLD AUTO: 5.24 X10 (3) UL (ref 1.5–7.7)
NEUTROPHILS # BLD AUTO: 5.24 X10(3) UL (ref 1.5–7.7)
NEUTROPHILS NFR BLD AUTO: 65.4 %
OSMOLALITY SERPL CALC.SUM OF ELEC: 292 MOSM/KG (ref 275–295)
PLATELET # BLD AUTO: 291 10(3)UL (ref 150–450)
POTASSIUM SERPL-SCNC: 4.3 MMOL/L (ref 3.5–5.1)
PROT SERPL-MCNC: 7.2 G/DL (ref 5.7–8.2)
RBC # BLD AUTO: 4.52 X10(6)UL
SODIUM SERPL-SCNC: 142 MMOL/L (ref 136–145)
TOTAL IRON BINDING CAPACITY: 285 UG/DL (ref 250–425)
TRANSFERRIN SERPL-MCNC: 220 MG/DL (ref 250–380)
WBC # BLD AUTO: 8 X10(3) UL (ref 4–11)

## 2024-12-17 PROCEDURE — 3074F SYST BP LT 130 MM HG: CPT | Performed by: FAMILY MEDICINE

## 2024-12-17 PROCEDURE — 83540 ASSAY OF IRON: CPT

## 2024-12-17 PROCEDURE — 3078F DIAST BP <80 MM HG: CPT | Performed by: FAMILY MEDICINE

## 2024-12-17 PROCEDURE — 83690 ASSAY OF LIPASE: CPT

## 2024-12-17 PROCEDURE — 36415 COLL VENOUS BLD VENIPUNCTURE: CPT

## 2024-12-17 PROCEDURE — G2211 COMPLEX E/M VISIT ADD ON: HCPCS | Performed by: FAMILY MEDICINE

## 2024-12-17 PROCEDURE — 83013 H PYLORI (C-13) BREATH: CPT

## 2024-12-17 PROCEDURE — 82728 ASSAY OF FERRITIN: CPT

## 2024-12-17 PROCEDURE — 99214 OFFICE O/P EST MOD 30 MIN: CPT | Performed by: FAMILY MEDICINE

## 2024-12-17 PROCEDURE — 80053 COMPREHEN METABOLIC PANEL: CPT

## 2024-12-17 PROCEDURE — 85025 COMPLETE CBC W/AUTO DIFF WBC: CPT

## 2024-12-17 PROCEDURE — 83550 IRON BINDING TEST: CPT

## 2024-12-17 PROCEDURE — 3008F BODY MASS INDEX DOCD: CPT | Performed by: FAMILY MEDICINE

## 2024-12-17 PROCEDURE — 74018 RADEX ABDOMEN 1 VIEW: CPT | Performed by: FAMILY MEDICINE

## 2024-12-17 NOTE — PROGRESS NOTES
[facility] Visit Note  12/17/2024      Subjective:      Patient ID: Faith Echavarria is a 39 year old female.    Chief Complaint:  Chief Complaint   Patient presents with    Pain     States she's been having pain under her right rib cage x 1 week.       HPI:  Faith Echavarria is a 39 year old female who is being seen today for the above.      Pain-  having pain under her right rib cage x 1 week. Rates pain as a 2-3/10 dull in nature.   Many in her family have had gallbladder surgery.   No known trauma  Tried drinking more water bc some constipation with iron tabs given by Dr. Rosen/rheum. Thought her joint pains were due to iron defic, but can't take them all the time due to constipation.  Not associated with food, BM,   Worsening factors- moving her torso, touching it  Alleviating factors- nothing but distraction.     Denies- n/v, sob, palpitations, diarrhea, constipation      Review of Systems - as stated above in the HPI      Objective:     Vitals:    12/17/24 1415   BP: 124/74   Pulse: 74   Resp: 16   Temp: 97.9 °F (36.6 °C)   TempSrc: Temporal   SpO2: 99%   Weight: 188 lb (85.3 kg)   Height: 5' 4\" (1.626 m)       Physical Examination   General:  Alert, in no acute distress  HEENT: NCAT, EOMI, mucus membranes moist   Neck:  No cervical lymphadenopathy  CV: Regular rate and rhythm. No murmurs, gallops, or rubs.  Lungs:  Clear to auscultation B, no wheezes, rales, or rhonchi, normal respiratory effort  Abd:  +bowel sounds, soft, tender of the epigastrium, no discrete hernia/impulse felt with coughing, no hepatomegaly  Ext:  No pedal edema,  Pedal pulses 2+ B        Assessment:     1. Epigastric pain  - XR ABDOMEN (1 VIEW) (CPT=74018); Future  - Comp Metabolic Panel (14) [E]; Future  - CBC W Differential W Platelet [E]; Future  - Lipase [E]; Future  - Helicobacter pylori Breath Test, Adult; Future    2. Iron deficiency  - CBC W Differential W Platelet [E]; Future  - Ferritin [E]; Future  - Iron And Tibc  [E]; Future      Diff dx:  constipation, hernia, gastritis, GERD, h pylori, cholecystitis, pancreatitis as on zepbound, etc.  -if not improving to consider famotidine and/or US/CT to check for hernia  -discussed trial of laying supine and massaging area if it's tender.      I am providing care as part of an ongoing, longitudinal care relationship.    Return for we will contact you with results, if symptoms worsen/don't improve.

## 2024-12-18 LAB — H PYLORI BREATH TEST: NEGATIVE

## 2024-12-23 DIAGNOSIS — R79.0 LOW IRON STORES: Primary | ICD-10-CM

## 2025-01-06 RX ORDER — TIRZEPATIDE 10 MG/.5ML
10 INJECTION, SOLUTION SUBCUTANEOUS WEEKLY
Qty: 2 ML | Status: SHIPPED | OUTPATIENT
Start: 2025-01-06

## 2025-01-06 NOTE — TELEPHONE ENCOUNTER
Requesting Zepbound 10mg  Last OV: 12/17/24  RTC: prn  Last Rx'd 11/26/24 #2mL with 0 refills    No future appointments.    Non-protocol med:  Rx pended and routed for approval/denial

## 2025-01-07 ENCOUNTER — OFFICE VISIT (OUTPATIENT)
Dept: FAMILY MEDICINE CLINIC | Facility: CLINIC | Age: 40
End: 2025-01-07
Payer: COMMERCIAL

## 2025-01-07 VITALS
WEIGHT: 184 LBS | BODY MASS INDEX: 31.41 KG/M2 | OXYGEN SATURATION: 98 % | HEIGHT: 64 IN | DIASTOLIC BLOOD PRESSURE: 72 MMHG | HEART RATE: 80 BPM | RESPIRATION RATE: 16 BRPM | TEMPERATURE: 98 F | SYSTOLIC BLOOD PRESSURE: 122 MMHG

## 2025-01-07 DIAGNOSIS — E61.1 IRON DEFICIENCY: ICD-10-CM

## 2025-01-07 DIAGNOSIS — E66.811 CLASS 1 OBESITY DUE TO EXCESS CALORIES WITHOUT SERIOUS COMORBIDITY WITH BODY MASS INDEX (BMI) OF 34.0 TO 34.9 IN ADULT: Primary | ICD-10-CM

## 2025-01-07 DIAGNOSIS — E66.09 CLASS 1 OBESITY DUE TO EXCESS CALORIES WITHOUT SERIOUS COMORBIDITY WITH BODY MASS INDEX (BMI) OF 34.0 TO 34.9 IN ADULT: Primary | ICD-10-CM

## 2025-01-07 PROCEDURE — 99214 OFFICE O/P EST MOD 30 MIN: CPT | Performed by: FAMILY MEDICINE

## 2025-01-07 PROCEDURE — 3078F DIAST BP <80 MM HG: CPT | Performed by: FAMILY MEDICINE

## 2025-01-07 PROCEDURE — 3008F BODY MASS INDEX DOCD: CPT | Performed by: FAMILY MEDICINE

## 2025-01-07 PROCEDURE — G2211 COMPLEX E/M VISIT ADD ON: HCPCS | Performed by: FAMILY MEDICINE

## 2025-01-07 PROCEDURE — 3074F SYST BP LT 130 MM HG: CPT | Performed by: FAMILY MEDICINE

## 2025-01-07 RX ORDER — TIRZEPATIDE 10 MG/.5ML
10 INJECTION, SOLUTION SUBCUTANEOUS WEEKLY
Qty: 6 ML | Refills: 0 | Status: SHIPPED | OUTPATIENT
Start: 2025-01-07

## 2025-01-07 NOTE — PROGRESS NOTES
[facility] Visit Note  1/7/2025      Subjective:      Patient ID: Faith Echavarria is a 39 year old female.    Chief Complaint:  Chief Complaint   Patient presents with    Weight Check    Lab Results     Done 12/17/24       HPI:  Faith Echavarria is a 39 year old female who is being seen today for the above.      Got an 8 mo severence and no longer working, but has an interview shortly      Lifestyle modifications- no issues with side effects on Zepbound 10mg and has been on same dose since Sept 2024. Lost 4# in the last 3wks and now 184#. Not walking in [place] currently with job loss, but was still happy she lost 4# last month over the holidays. Happy with the loss rate and wants to cont same dose of Zepbound 10mg.     Goal: wanting to get her Tgs down.  Wt-wise: wants ~150#.        History 7/26/24: lost 8# in 1 mo. No side effects. Doesn't feel full, but feels satisfied. Is able to resist temptations easier and think if she really needs something and can make better choices. Ate out of boredom.     History 7/1/24: started Zepbound after her L bunion surgery on 6/10/24. Food diary tends to be low in fruit/veggies, but does like them. Fast food quite a bit. Doesn't meal plan.  Has to be in the boot for 4-5 more weeks after the bunionectomy, L.  Stools used to be a little loose and now     Denies- constipation, nausea, vomiting.      Taking: Zepbound  Started at: 6/10/24, 225#, BMI 38.74  226.5# start at home, 217.5#on 7/1/24.   Since Last visit: lost  3#, 1 month  Total Difference: lost 41#, 7 mo     Zepbound:  -2.5 mg 6/10//24  -5mg 7/1/24, 7/26/24  -7.5mg 8/23/24  -10mg 9/11/24, 3 mo scripts 10-12/24, 1-3/25           Iron defic-  Hb 13.5, Ferritin 39, TIBC 285 normal, but serum iron, iron sat and transferrin are all low on 12/17/24  Hasn't taken her ferrous sulfate regularly as it upsets her stomach. Also drinks Macha/green tea daily (1tsp). Trying to space out the matcha and the ferrous  sulfate.  Doesn't feel tired   Still hasn't gotten the stool study done yet.   Has joint pain, thus Dr. Rosen, rheum put her on ferrous sulfate.        Review of Systems - as stated above in the HPI      Objective:     Vitals:    01/07/25 1339   BP: 122/72   Pulse: 80   Resp: 16   Temp: 98.2 °F (36.8 °C)   TempSrc: Temporal   SpO2: 98%   Weight: 184 lb (83.5 kg)   Height: 5' 4\" (1.626 m)       Physical Examination   General:  Alert, in no acute distress  HEENT: NCAT, EOMI,mucus membranes moist   Neck:  No cervical lymphadenopathy  CV: Regular rate and rhythm. No murmurs, gallops, or rubs.  Lungs:  Clear to auscultation B, no wheezes, rales, or rhonchi, normal respiratory effort  Abd:  +bowel sounds, soft, obese  Ext:  No pedal edema,  Pedal pulses 2+ B  Psych: normal mood, affect, and hygiene       Assessment:     1. Class 1 obesity due to excess calories without serious comorbidity with body mass index (BMI) of 34.0 to 34.9 in adult  - Tirzepatide-Weight Management (ZEPBOUND) 10 MG/0.5ML Subcutaneous Solution Auto-injector; Inject 10 mg into the skin once a week.  Dispense: 6 mL; Refill: 0    2. Iron deficiency  -gave FIT test kit and reminded of importance of ruling out microscopic bleeding as a source.  -will reduce matcha and space it out from her iron tab      I am providing care as part of an ongoing, longitudinal care relationship.    Return in about 3 months (around 4/7/2025) for f/u lifestlye changes, we will contact you with results of FIT test.

## 2025-01-08 ENCOUNTER — LAB ENCOUNTER (OUTPATIENT)
Dept: LAB | Age: 40
End: 2025-01-08
Attending: FAMILY MEDICINE
Payer: COMMERCIAL

## 2025-01-08 DIAGNOSIS — R79.0 LOW IRON STORES: ICD-10-CM

## 2025-01-08 LAB — HEMOCCULT STL QL: NEGATIVE

## 2025-01-08 PROCEDURE — 82274 ASSAY TEST FOR BLOOD FECAL: CPT

## 2025-01-11 DIAGNOSIS — E61.1 IRON DEFICIENCY: Primary | ICD-10-CM

## 2025-01-30 ENCOUNTER — TELEPHONE (OUTPATIENT)
Dept: FAMILY MEDICINE CLINIC | Facility: CLINIC | Age: 40
End: 2025-01-30

## 2025-01-30 NOTE — TELEPHONE ENCOUNTER
Prior Authorization submitted & approved via Cover My Meds for the Tirzepatide-Weight Management (ZEPBOUND) 10 MG/0.5ML Subcutaneous Solution Auto-injector       Your request has been approved  CaseId:[id number];Status:Approved;  Start Date:12/31/2024;Coverage End Date:09/27/2025.        [facility] pharmacy notified of the approval.              .

## 2025-03-31 ENCOUNTER — OFFICE VISIT (OUTPATIENT)
Dept: FAMILY MEDICINE CLINIC | Facility: CLINIC | Age: 40
End: 2025-03-31
Payer: COMMERCIAL

## 2025-03-31 VITALS
HEIGHT: 64 IN | HEART RATE: 62 BPM | WEIGHT: 175 LBS | OXYGEN SATURATION: 99 % | BODY MASS INDEX: 29.88 KG/M2 | SYSTOLIC BLOOD PRESSURE: 124 MMHG | DIASTOLIC BLOOD PRESSURE: 74 MMHG | TEMPERATURE: 98 F | RESPIRATION RATE: 16 BRPM

## 2025-03-31 DIAGNOSIS — E78.2 MIXED HYPERLIPIDEMIA: ICD-10-CM

## 2025-03-31 DIAGNOSIS — E66.09 CLASS 1 OBESITY DUE TO EXCESS CALORIES WITHOUT SERIOUS COMORBIDITY WITH BODY MASS INDEX (BMI) OF 30.0 TO 30.9 IN ADULT: Primary | ICD-10-CM

## 2025-03-31 DIAGNOSIS — E55.9 VITAMIN D DEFICIENCY: ICD-10-CM

## 2025-03-31 DIAGNOSIS — E66.811 CLASS 1 OBESITY DUE TO EXCESS CALORIES WITHOUT SERIOUS COMORBIDITY WITH BODY MASS INDEX (BMI) OF 30.0 TO 30.9 IN ADULT: Primary | ICD-10-CM

## 2025-03-31 DIAGNOSIS — S82.831D OTHER CLOSED FRACTURE OF DISTAL END OF RIGHT FIBULA WITH ROUTINE HEALING, SUBSEQUENT ENCOUNTER: ICD-10-CM

## 2025-03-31 DIAGNOSIS — E61.1 IRON DEFICIENCY: ICD-10-CM

## 2025-03-31 PROCEDURE — 3078F DIAST BP <80 MM HG: CPT | Performed by: FAMILY MEDICINE

## 2025-03-31 PROCEDURE — G2211 COMPLEX E/M VISIT ADD ON: HCPCS | Performed by: FAMILY MEDICINE

## 2025-03-31 PROCEDURE — 3074F SYST BP LT 130 MM HG: CPT | Performed by: FAMILY MEDICINE

## 2025-03-31 PROCEDURE — 3008F BODY MASS INDEX DOCD: CPT | Performed by: FAMILY MEDICINE

## 2025-03-31 PROCEDURE — 99214 OFFICE O/P EST MOD 30 MIN: CPT | Performed by: FAMILY MEDICINE

## 2025-03-31 RX ORDER — TIRZEPATIDE 10 MG/.5ML
10 INJECTION, SOLUTION SUBCUTANEOUS WEEKLY
Qty: 6 ML | Refills: 0 | Status: SHIPPED | OUTPATIENT
Start: 2025-03-31

## 2025-03-31 NOTE — PROGRESS NOTES
[facility] Visit Note  3/31/2025      Subjective:      Patient ID: Faith Echavarria is a 39 year old female.    Chief Complaint:  Chief Complaint   Patient presents with    Weight Check    Referral     States she was treated at [facility] yesterday for right ankle pain after rolling the ankle. Had xray taken & was dx with having displaced fracture of lateral malleolus of right fibula. Today's is wearing a boot. Has an appt to see her Podiatrist tomorrow and wants to discuss referral to see Ortho. Patient brought in records for review.       HPI:  Faith Echavarria is a 39 year old female who is being seen today for the above.      R fibular avulsion fx- occurred while at [employer] while jumping over different colored lights as part of a game with her 2 young boys. Within the first 3 min she landed wrong on her ankle. Went to a prompt care and the imaging reports a small avulsion fracture of the tip of the lateral malleolus. She has a h/o bunion surgery so has a boot as well as a scooter and is using that currently. She has an appt with Dr. Ramos her podiatrist for tomorrow, but isn't sure if he is the person to treat her or ortho.    Got a new job, [employer]. Can't drive with a boot on her R leg and would be difficult to use the scooter on the uneven surfaces and roads of [place]. New at the job so doesn't have short-term disability, but hoping to get an accomodation to work from home.        Lifestyle modifications- no issues with side effects on Zepbound 10mg and has been on same dose since Sept 2024. Lost 9# in the last 2mo and now 175#. Just fractured her distal fibula so in a boot and will see podiatry, but exercise will be less for a while. 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 Happy with the loss rate and wants to cont same dose of Zepbound 10mg.     Goal: wanting to get her Tgs down.  Wt-wise: wants ~150#.         History  7/26/24: lost 8# in 1 mo. No side effects. Doesn't feel full, but feels satisfied. Is able to resist temptations easier and think if she really needs something and can make better choices. Ate out of boredom.     History 7/1/24: started Zepbound after her L bunion surgery on 6/10/24. Food diary tends to be low in fruit/veggies, but does like them. Fast food quite a bit. Doesn't meal plan.  Has to be in the boot for 4-5 more weeks after the bunionectomy, L.  Stools used to be a little loose and now     Denies- constipation, nausea, vomiting.      Taking: Zepbound 2.5 mg 6/10//24  -5mg 7/1/24, 7/26/24  -7.5mg 8/23/24  -10mg 9/11/24, 3 mo scripts 10/12/24, 1/3/25   Started at: 6/10/24, 225#, BMI 38.74  226.5# start at home, 217.5#on 7/1/24.   Since Last visit: lost  9#, 2 month  Total Difference: lost 50#, 9 mo      Iron defic-  Hb 13.5, Ferritin 39, TIBC 285 normal, but serum iron, iron sat and transferrin are all low on 12/17/24  Hasn't taken her ferrous sulfate regularly as it upsets her stomach, so will try OTC Slow Fe.   Also drinks Macha/green tea daily (1tsp). Trying to space out the matcha and the ferrous sulfate.  Doesn't feel tired   Stool study neg on 1/8/25   Has joint pain, thus Dr. Rosen, rheum put her on ferrous sulfate.    Review of Systems - as stated above in the HPI      Objective:     Vitals:    03/31/25 1139   BP: 124/74   Pulse: 62   Resp: 16   Temp: 98.2 °F (36.8 °C)   TempSrc: Temporal   SpO2: 99%   Weight: 175 lb (79.4 kg)   Height: 5' 4\" (1.626 m)       Physical Examination   General:  Alert, in no acute distress  HEENT: NCAT, EOMI, normal TMs, oropharynx, and nasal turbinates.  Neck:  No cervical lymphadenopathy  CV: Regular rate and rhythm. No murmurs, gallops, or rubs.  Lungs:  Clear to auscultation B, no wheezes, rales, or rhonchi, normal respiratory effort  Abd:  +bowel sounds, soft  Ext:  No pedal edema,  Pedal pulses 2+ B        Assessment:     1. Class 1 obesity due to excess calories  without serious comorbidity with body mass index (BMI) of 30.0 to 30.9 in adult  - Tirzepatide-Weight Management (ZEPBOUND) 10 MG/0.5ML Subcutaneous Solution Auto-injector; Inject 10 mg into the skin once a week.  Dispense: 6 mL; Refill: 0    2. Other closed fracture of distal end of right fibula with routine healing, subsequent encounter    3. Iron deficiency  - CBC With Differential With Platelet; Future  - Ferritin; Future  - Iron And Tibc; Future    4. Vitamin D deficiency  - Vitamin D [E]; Future    5. Mixed hyperlipidemia  - Comp Metabolic Panel (14); Future  - Lipid Panel; Future        I am providing care as part of an ongoing, longitudinal care relationship.    Return in about 3 months (around 6/30/2025) for f/u .

## 2025-04-28 ENCOUNTER — PATIENT MESSAGE (OUTPATIENT)
Dept: FAMILY MEDICINE CLINIC | Facility: CLINIC | Age: 40
End: 2025-04-28

## 2025-05-12 ENCOUNTER — TELEPHONE (OUTPATIENT)
Dept: FAMILY MEDICINE CLINIC | Facility: CLINIC | Age: 40
End: 2025-05-12

## 2025-06-15 ENCOUNTER — LAB ENCOUNTER (OUTPATIENT)
Dept: LAB | Facility: HOSPITAL | Age: 40
End: 2025-06-15
Attending: FAMILY MEDICINE
Payer: COMMERCIAL

## 2025-06-15 DIAGNOSIS — E55.9 VITAMIN D DEFICIENCY: ICD-10-CM

## 2025-06-15 DIAGNOSIS — E78.2 MIXED HYPERLIPIDEMIA: ICD-10-CM

## 2025-06-15 DIAGNOSIS — E61.1 IRON DEFICIENCY: ICD-10-CM

## 2025-06-15 LAB
ALBUMIN SERPL-MCNC: 4.3 G/DL (ref 3.2–4.8)
ALBUMIN/GLOB SERPL: 1.7 {RATIO} (ref 1–2)
ALP LIVER SERPL-CCNC: 56 U/L (ref 37–98)
ALT SERPL-CCNC: 12 U/L (ref 10–49)
ANION GAP SERPL CALC-SCNC: 7 MMOL/L (ref 0–18)
AST SERPL-CCNC: 18 U/L (ref ?–34)
BASOPHILS # BLD AUTO: 0.03 X10(3) UL (ref 0–0.2)
BASOPHILS NFR BLD AUTO: 0.6 %
BILIRUB SERPL-MCNC: 0.8 MG/DL (ref 0.3–1.2)
BUN BLD-MCNC: 10 MG/DL (ref 9–23)
CALCIUM BLD-MCNC: 9.1 MG/DL (ref 8.7–10.6)
CHLORIDE SERPL-SCNC: 108 MMOL/L (ref 98–112)
CHOLEST SERPL-MCNC: 157 MG/DL (ref ?–200)
CO2 SERPL-SCNC: 29 MMOL/L (ref 21–32)
CREAT BLD-MCNC: 1 MG/DL (ref 0.55–1.02)
DEPRECATED HBV CORE AB SER IA-ACNC: 35 NG/ML (ref 50–306)
EGFRCR SERPLBLD CKD-EPI 2021: 72 ML/MIN/1.73M2 (ref 60–?)
EOSINOPHIL # BLD AUTO: 0.07 X10(3) UL (ref 0–0.7)
EOSINOPHIL NFR BLD AUTO: 1.3 %
ERYTHROCYTE [DISTWIDTH] IN BLOOD BY AUTOMATED COUNT: 12.3 %
FASTING PATIENT LIPID ANSWER: YES
FASTING STATUS PATIENT QL REPORTED: YES
GLOBULIN PLAS-MCNC: 2.6 G/DL (ref 2–3.5)
GLUCOSE BLD-MCNC: 72 MG/DL (ref 70–99)
HCT VFR BLD AUTO: 39.3 % (ref 35–48)
HDLC SERPL-MCNC: 53 MG/DL (ref 40–59)
HGB BLD-MCNC: 13.2 G/DL (ref 12–16)
IMM GRANULOCYTES # BLD AUTO: 0.02 X10(3) UL (ref 0–1)
IMM GRANULOCYTES NFR BLD: 0.4 %
IRON SATN MFR SERPL: 25 % (ref 15–50)
IRON SERPL-MCNC: 66 UG/DL (ref 50–170)
LDLC SERPL CALC-MCNC: 86 MG/DL (ref ?–100)
LYMPHOCYTES # BLD AUTO: 1.64 X10(3) UL (ref 1–4)
LYMPHOCYTES NFR BLD AUTO: 30.1 %
MCH RBC QN AUTO: 29.5 PG (ref 26–34)
MCHC RBC AUTO-ENTMCNC: 33.6 G/DL (ref 31–37)
MCV RBC AUTO: 87.7 FL (ref 80–100)
MONOCYTES # BLD AUTO: 0.46 X10(3) UL (ref 0.1–1)
MONOCYTES NFR BLD AUTO: 8.4 %
NEUTROPHILS # BLD AUTO: 3.23 X10 (3) UL (ref 1.5–7.7)
NEUTROPHILS # BLD AUTO: 3.23 X10(3) UL (ref 1.5–7.7)
NEUTROPHILS NFR BLD AUTO: 59.2 %
NONHDLC SERPL-MCNC: 104 MG/DL (ref ?–130)
OSMOLALITY SERPL CALC.SUM OF ELEC: 296 MOSM/KG (ref 275–295)
PLATELET # BLD AUTO: 292 10(3)UL (ref 150–450)
POTASSIUM SERPL-SCNC: 4.7 MMOL/L (ref 3.5–5.1)
PROT SERPL-MCNC: 6.9 G/DL (ref 5.7–8.2)
RBC # BLD AUTO: 4.48 X10(6)UL (ref 3.8–5.3)
SODIUM SERPL-SCNC: 144 MMOL/L (ref 136–145)
TOTAL IRON BINDING CAPACITY: 269 UG/DL (ref 250–425)
TRANSFERRIN SERPL-MCNC: 195 MG/DL (ref 250–380)
TRIGL SERPL-MCNC: 96 MG/DL (ref 30–149)
VIT D+METAB SERPL-MCNC: 31.8 NG/ML (ref 30–100)
VLDLC SERPL CALC-MCNC: 15 MG/DL (ref 0–30)
WBC # BLD AUTO: 5.5 X10(3) UL (ref 4–11)

## 2025-06-15 PROCEDURE — 83540 ASSAY OF IRON: CPT

## 2025-06-15 PROCEDURE — 36415 COLL VENOUS BLD VENIPUNCTURE: CPT

## 2025-06-15 PROCEDURE — 80061 LIPID PANEL: CPT

## 2025-06-15 PROCEDURE — 82306 VITAMIN D 25 HYDROXY: CPT

## 2025-06-15 PROCEDURE — 80053 COMPREHEN METABOLIC PANEL: CPT

## 2025-06-15 PROCEDURE — 82728 ASSAY OF FERRITIN: CPT

## 2025-06-15 PROCEDURE — 83550 IRON BINDING TEST: CPT

## 2025-06-15 PROCEDURE — 85025 COMPLETE CBC W/AUTO DIFF WBC: CPT

## 2025-06-19 ENCOUNTER — OFFICE VISIT (OUTPATIENT)
Dept: FAMILY MEDICINE CLINIC | Facility: CLINIC | Age: 40
End: 2025-06-19
Payer: COMMERCIAL

## 2025-06-19 VITALS
HEART RATE: 76 BPM | BODY MASS INDEX: 28.85 KG/M2 | TEMPERATURE: 98 F | OXYGEN SATURATION: 98 % | RESPIRATION RATE: 16 BRPM | SYSTOLIC BLOOD PRESSURE: 120 MMHG | HEIGHT: 64 IN | DIASTOLIC BLOOD PRESSURE: 70 MMHG | WEIGHT: 169 LBS

## 2025-06-19 DIAGNOSIS — Z12.31 ENCOUNTER FOR SCREENING MAMMOGRAM FOR MALIGNANT NEOPLASM OF BREAST: ICD-10-CM

## 2025-06-19 DIAGNOSIS — Z86.39 HISTORY OF OBESITY: ICD-10-CM

## 2025-06-19 DIAGNOSIS — E61.1 IRON DEFICIENCY: ICD-10-CM

## 2025-06-19 DIAGNOSIS — Z00.00 ROUTINE MEDICAL EXAM: Primary | ICD-10-CM

## 2025-06-19 DIAGNOSIS — E66.3 OVERWEIGHT (BMI 25.0-29.9): ICD-10-CM

## 2025-06-19 PROCEDURE — 99214 OFFICE O/P EST MOD 30 MIN: CPT | Performed by: FAMILY MEDICINE

## 2025-06-19 PROCEDURE — 99396 PREV VISIT EST AGE 40-64: CPT | Performed by: FAMILY MEDICINE

## 2025-06-19 RX ORDER — TIRZEPATIDE 12.5 MG/.5ML
12.5 INJECTION, SOLUTION SUBCUTANEOUS WEEKLY
Qty: 2 ML | Refills: 0 | Status: SHIPPED | OUTPATIENT
Start: 2025-06-19 | End: 2025-07-11

## 2025-06-19 NOTE — PROGRESS NOTES
Chief Complaint   Patient presents with    Physical    Lab Results     Done 6/15/25.       HPI:  Faith Echavarria is a 40 year old female here today for preventative visit.     Imms- up-to-date with covid & Tdap.        Cervical cancer screening-  Had a h/o LEEP + HPV and since then paps are all normal. Said her gyne, Dr. Diego, recommended screening 1 q 3 yrs.   ASCUS with neg HPV on 1/29/19 in care everywhere.   2/4/20 Normal co-testing with Gyne- Dr. Diego in [facility].  4/28/23 co-testing normal with Patricia Diego. Repeat 5 yrs.  Goes 8/14/25        Breast cancer screening- 2 maternal half aunts had breast cancer.  Neg test for BRCA-1 & BRCA-2 gene. Last mamm 3/9/22. Does it through her gyne, Dr. Diego, [facility] the same day as her gyne appt. Does it yearly, but we have not gotten results, so will be sure to sign record release yearly.  -asking about breast MRI. Offered to refer to breast clinic, but says [facility] has one as well and will ask her gyne.        Colon cancer screening- No family h/o colon cancer.  Neg FIT 1/8/25 done due to iron defic.       Dental/eye visits- recommended seeing dentist q 6mo and eye doctor 1/yr.        Diet/exercise- working on this. Exercises well, but eats too much. 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 Saw Dr. Gris zayas and repeated imaging and said it resolved. Pt had repeat MRI pituitary done 8/31/18 at [facility] ordered by Dr. Lawson and no evidence of pituitary adenoma see below note. 1st MRI in 2015 said artifactual or subtle pituitary microadenoma.     Outside medical records reviewed and will scan to [facility].  In brief:     12/24/15 MRI pituitary ([facility])  - 2 x 6mm area within the pituitary that may be artifactual or represent a very subtle pituitary microadenoma  - cavernous sinus preserved  -  optic chiasm nondisplaced     8/31/18 MRI pituitary ([facility])  - normal MRI of brain with no evidence of pituitary adenoma     Please inform pt that we received her MRI reports from [facility] and her 8/2018 MRI was normal and no longer demonstrates pituitary adenoma.  Given this, her previous MRI abnormality may have been artifact or the microadenoma resolved.  Therefore, she does not need to get those pituitary labs done and I have cancelled them.  No need for future follow-up in endocrine clinic unless recommended by PCP.  RTC prn.         HyperTG-  on 2/3/23, , rest is normal and now LDL 86, tot chol 157, TG 96, rest normal on 6/15/25        Eyelid drooping- L side and has aching of the eye at times. Would like to see an ophthalmologist for further eval.        Joint pain- all over. Hard to exercise with it. Mother has RA. Had a neg rheum panel in 2020.        Lifestyle modifications- no issues with side effects on Zepbound 10mg and has been on same dose since Sept 2024 (9 mo). Has lost at least 25# in the that time, but would like to increase to the next dose now. Just got out of the boot for her distal fibula fx 2 d ago 6/17/25.     Goal: wanting to get her Tgs down.  Wt-wise: wants ~150#.      History 7/26/24: lost 8# in 1 mo. No side effects. Doesn't feel full, but feels satisfied. Is able to resist temptations easier and think if she really needs something and can make better choices. Ate out of boredom.     History 7/1/24: started Zepbound after her L bunion surgery on 6/10/24. Food diary tends to be low in fruit/veggies, but does like them. Fast food quite a bit. Doesn't meal plan.  Has to be in the boot for 4-5 more weeks after the bunionectomy, L.  Stools used to be a little loose and now     Denies- constipation, nausea, vomiting.      Taking: Zepbound 2.5 mg 6/10//24  -5mg 7/1/24, 7/26/24  -7.5mg 8/23/24  -10mg 9/11/24, 3 mo scripts 10/12/24, 1/3/25   -12.5mg 6/19/25  Started at:  6/10/24, 225#, BMI 38.74  226.5# start at home, 217.5#on 7/1/24.   Since Last visit: lost  6#, 1 month  Total Difference: lost 56#, 11mo  Other changes: TGs are normal 6/25!        Iron defic- taking slow iron x 6 wk for 4d/wk. Hb still normal, and the ferritin is still low at 35. Hb 13.2 on 6/15/25.   Neg FIT test- 1/8/25  Periods are light. Last 3-5d and uses 1-2 pads/d.    She used to be vegan and still eats little meat. She bought a black stone for her  so hopes to be eating more as he will be cooking more meat and hasn't incorporated beans yet, but says she will. Also got a cast iron pan.  Likely due to low iron intake.    History:  Hb 13.5, Ferritin 39, TIBC 285 normal, but serum iron, iron sat and transferrin are all low on 12/17/24  Hasn't taken her ferrous sulfate regularly as it upsets her stomach, so will try OTC Slow Fe.   Also drinks Macha/green tea daily (1tsp). Trying to space out the matcha and the ferrous sulfate.  Doesn't feel tired   Stool study neg on 1/8/25   Has joint pain, thus Dr. Rosen, rheum put her on ferrous sulfate.         Past Medical History[1]  Past Surgical History[2]  Medications Ordered Prior to Encounter[3]  Allergies[4]  Social History     Socioeconomic History    Marital status:      Spouse name: Not on file    Number of children: Not on file    Years of education: Not on file    Highest education level: Not on file   Occupational History    Not on file   Tobacco Use    Smoking status: Never    Smokeless tobacco: Never   Vaping Use    Vaping status: Never Used   Substance and Sexual Activity    Alcohol use: Yes     Comment: 1 glass wine/mo, never a problem    Drug use: Never    Sexual activity: Yes     Partners: Male     Birth control/protection: Vasectomy   Other Topics Concern    Caffeine Concern Not Asked    Exercise Not Asked    Seat Belt Not Asked    Special Diet Not Asked    Stress Concern Not Asked    Weight Concern Not Asked   Social History Narrative     Not on file     Social Drivers of Health     Food Insecurity: No Food Insecurity (6/19/2025)    NCSS - Food Insecurity     Worried About Running Out of Food in the Last Year: No     Ran Out of Food in the Last Year: No   Transportation Needs: No Transportation Needs (6/19/2025)    NCSS - Transportation     Lack of Transportation: No   Stress: Not on file   Housing Stability: Not At Risk (6/19/2025)    NCSS - Housing/Utilities     Has Housing: Yes     Worried About Losing Housing: No     Unable to Get Utilities: No     Family History[5]    Review of Systems - All systems reviewed and negative except for HPI    PHYSICAL EXAM:  /70   Pulse 76   Temp 97.9 °F (36.6 °C) (Temporal)   Resp 16   Ht 5' 4\" (1.626 m)   Wt 169 lb (76.7 kg)   LMP 05/29/2025 (Exact Date)   SpO2 98%   BMI 29.01 kg/m²   GENERAL APPEARANCE:  Alert, no acute distress, appears stated age  HEENT:  Head- Normocephalic, atraumatic.    Eyes- Extraocular movements intact, pupils equally round and reactive to light,  conjunctivae normal.    Ears- Tympanic membranes intact bilaterally.    Nose- Patent, normal septum and turbinates.    Mouth/Throat- Normal oral mucosa, throat non-erythematous.  NECK:  No submental, submandibular, ant/post cervical lymphadenopathy. No thyromegaly or masses.  PULMONARY:  Lungs clear to auscultation bilaterally. No wheezes, rales, or rhonchi. Normal respiratory effort.  CARDIOVASCULAR:  Regular rate and rhythm. No murmurs, gallops, or rubs.  ABDOMEN:  + bowel sounds, soft, nontender, nondistended. No hepatomegaly.  MUSCULOSKELETAL: Strength of upper and lower extremities 5/5 bilaterally. Normal gait.  NEUROLOGIC:  Cranial nerves 2-12 grossly intact.  PSYCHIATRIC:  Normal mood, affect, and hygiene.     ASSESSMENT/PLAN:    1. Routine medical exam    2. Encounter for screening mammogram for malignant neoplasm of breast  - [facility] NICK 2D+3D SCREENING BILAT (CPT=77067/39456); Future    3. Iron deficiency  - CBC With  Differential With Platelet; Future  - Ferritin; Future  - Iron And Tibc; Future  -felt to be due to low iron intake  -cont but be more consistent with slow iron, use cast iron pan, incr beans, and incr meat/iron rich foods    4. Overweight (BMI 25.0-29.9)  - Tirzepatide-Weight Management (ZEPBOUND) 12.5 MG/0.5ML Subcutaneous Solution Auto-injector; Inject 12.5 mg into the skin once a week for 4 doses.  Dispense: 2 mL; Refill: 0    5. History of obesity  - Tirzepatide-Weight Management (ZEPBOUND) 12.5 MG/0.5ML Subcutaneous Solution Auto-injector; Inject 12.5 mg into the skin once a week for 4 doses.  Dispense: 2 mL; Refill: 0        Patient verbalized understanding and agrees to plan.      Return in about 1 month (around 7/19/2025) for f/u zepbound dose adjustment.         [1]   Past Medical History:   Abnormal Pap smear of cervix    + LEEALEN, Dr. Diego [facility], + ASCUS neg HPV on 1/29/19 pap    Bilateral plantar fasciitis    Chemical pregnancy (HCC)    Family history of malignant hyperthermia    mother    Mixed hyperlipidemia    Vitamin D deficiency   [2]   Past Surgical History:  Procedure Laterality Date    Bunionectomy Right 01/26/2024    + juliana correction, Dr. Ramos    Bunionectomy Left 06/2024    Dr. Ramos    Foot surgery      Hc epidural anesthesia      [place]  2008    Lipoma removal Left 12/17/2020    L axillary mass removal (8x11in), Dr. Lynn Vazquez    [place] teeth removed     [3]   No current outpatient medications on file prior to visit.     No current facility-administered medications on file prior to visit.   [4]   Allergies  Allergen Reactions    Dust Mites ITCHING     Itchy eyes   [5]   Family History  Problem Relation Age of Onset    Hypertension Mother     High Cholesterol Mother     Malignant Hypothermia Mother     Cancer Mother         multiple myeloma    Diabetes Mother     Other (rheumatoid arthritis) Mother     Hypertension Father     Heart Disease Father         bypass    High  Cholesterol Father     Other (COPD) Father     Other (gout) Father     Cancer Brother         melanoma    Other (GI disease) Brother         chrons possibly    No Known Problems Maternal Grandmother     Dementia Maternal Grandfather     Diabetes Maternal Grandfather     No Known Problems Paternal Grandmother     Heart Disease Paternal Grandfather     Breast Cancer Maternal Aunt     Breast Cancer Maternal Aunt

## 2025-07-18 ENCOUNTER — OFFICE VISIT (OUTPATIENT)
Dept: FAMILY MEDICINE CLINIC | Facility: CLINIC | Age: 40
End: 2025-07-18
Payer: COMMERCIAL

## 2025-07-18 VITALS
HEART RATE: 70 BPM | WEIGHT: 167 LBS | TEMPERATURE: 98 F | HEIGHT: 64 IN | BODY MASS INDEX: 28.51 KG/M2 | SYSTOLIC BLOOD PRESSURE: 112 MMHG | OXYGEN SATURATION: 97 % | RESPIRATION RATE: 16 BRPM | DIASTOLIC BLOOD PRESSURE: 70 MMHG

## 2025-07-18 DIAGNOSIS — E66.3 OVERWEIGHT (BMI 25.0-29.9): Primary | ICD-10-CM

## 2025-07-18 DIAGNOSIS — Z86.39 HISTORY OF OBESITY: ICD-10-CM

## 2025-07-18 PROCEDURE — 99213 OFFICE O/P EST LOW 20 MIN: CPT | Performed by: FAMILY MEDICINE

## 2025-07-18 RX ORDER — TIRZEPATIDE 12.5 MG/.5ML
12.5 INJECTION, SOLUTION SUBCUTANEOUS WEEKLY
Qty: 6 ML | Refills: 0 | Status: SHIPPED | OUTPATIENT
Start: 2025-07-18

## 2025-07-18 RX ORDER — TIRZEPATIDE 12.5 MG/.5ML
12.5 INJECTION, SOLUTION SUBCUTANEOUS WEEKLY
COMMUNITY
End: 2025-07-18

## 2025-07-18 NOTE — PROGRESS NOTES
[facility] Visit Note  7/18/2025      Subjective:      Patient ID: Faith Echavarria is a 40 year old female.    Chief Complaint:  Chief Complaint   Patient presents with    Weight Check     Here for 1 month f/u, taking Zepbound 12.5 mg. Down 2# in last month.       HPI:  Faith Echavarria is a 40 year old female who is being seen today for the above.     Lifestyle modifications-   7/18/25: currently on Zepbound 12.5mg x 2wls and lost 2# in the last month to be at 167#. Has some mild constipation with the slow Fe, no difference in it with the increase in Zepbound. No n/v, diarrhea. Feels comfortable staying on this x 3 mo. Went to the [employer] & bought new hiking clothes.      6/19/25: no issues with side effects on Zepbound 10mg and has been on same dose since Sept 2024 (9 mo). Has lost at least 25# in the that time, but would like to increase to the next dose now. Just got out of the boot for her distal fibula fx 2 d ago 6/17/25.     Goal: wanting to get her Tgs down.  Wt-wise: wants ~150#.      History 7/26/24: lost 8# in 1 mo. No side effects. Doesn't feel full, but feels satisfied. Is able to resist temptations easier and think if she really needs something and can make better choices. Ate out of boredom.     History 7/1/24: started Zepbound after her L bunion surgery on 6/10/24. Food diary tends to be low in fruit/veggies, but does like them. Fast food quite a bit. Doesn't meal plan.  Has to be in the boot for 4-5 more weeks after the bunionectomy, L.  Stools used to be a little loose and now     Denies- constipation, nausea, vomiting.      Taking: Zepbound 2.5 mg 6/10//24  -5mg 7/1/24, 7/26/24  -7.5mg 8/23/24  -10mg 9/11/24, 3 mo scripts 10/12/24, 1/3/25   -12.5mg 6/19/25  Started at: 6/10/24, 225#, BMI 38.74  226.5# start at home, 217.5#on 7/1/24.   Since Last visit: lost  2#, 1 month  Total Difference: lost 58#, 1yr, 1 mo  Other changes: TGs are normal 6/25!, no longer obese! Clothes  fit better, participating more in things.     No h/o thyroid cancer or MEN 2.      Review of Systems - as stated above in the HPI      Objective:     Vitals:    07/18/25 0743   BP: 112/70   Pulse: 70   Resp: 16   Temp: 97.9 °F (36.6 °C)   TempSrc: Temporal   SpO2: (!) 7%   Weight: 167 lb (75.8 kg)   Height: 5' 4\" (1.626 m)       Physical Examination   General:  Alert, in no acute distress  HEENT: NCAT, EOMI, mucus membranes moist   Neck:  No cervical lymphadenopathy  CV: Regular rate and rhythm. No murmurs, gallops, or rubs.  Lungs:  Clear to auscultation B, no wheezes, rales, or rhonchi, normal respiratory effort  Abd:  +bowel sounds, soft  Ext:  No pedal edema,  Pedal pulses 2+ B        Assessment:     1. Overweight (BMI 25.0-29.9)  - Tirzepatide-Weight Management (ZEPBOUND) 12.5 MG/0.5ML Subcutaneous Solution Auto-injector; Inject 12.5 mg into the skin once a week.  Dispense: 6 mL; Refill: 0    2. History of obesity  -will stay on dose x 3 mo      I am providing care as part of an ongoing, longitudinal care relationship.    Return in about 3 months (around 10/18/2025) for f/u lifestlye modification..

## 2025-08-18 ENCOUNTER — PATIENT MESSAGE (OUTPATIENT)
Dept: FAMILY MEDICINE CLINIC | Facility: CLINIC | Age: 40
End: 2025-08-18

## (undated) NOTE — LETTER
Date: 10/27/2024    Patient Name: Faith Echavarria          To Whom it may concern:    The above patient was seen at [facility] for treatment of amaxophobia/driving phobia. Please allow an accomodation to work remotely as her commute causing great distress.            Sincerely,        Naomi Gao MD

## (undated) NOTE — LETTER
Date: 11/4/2024    Patient Name: Faith Echavarria          To Whom it may concern:    The above is a patient of  [facility] for treatment of a medical condition. I have completed the form along with Faith (Lzizy) today in the office.        Sincerely,        Naomi Gao MD

## (undated) NOTE — LETTER
Date: 7/5/2022    Patient Name: Frederick Kendall          To Whom it may concern: The above patient recovered from COVID-19 in the last 90 days and is clear to travel.        Sincerely,    Tyrel Jasso MD

## (undated) NOTE — LETTER
Date: 1/17/2024    Patient Name: Faith Echavarria          To Whom it may concern:    The above is a patient of [facility]. Please excuse her from work for the dates of 1/18/24 & 1/19/24 due to illness.        Sincerely,        Naomi Gao MD

## (undated) NOTE — LETTER
08/30/18        [place] [address]      Dear Abbie Renner,    [facility] records indicate that you have outstanding lab work and or testing that was ordered for you and has not yet been completed:          Lipid Panel [E]

## (undated) NOTE — LETTER
20    Patient: Arabella Galvan  : 1985 Visit date: 2020    Dear  Dr. Giovanna Baumann MD,    Thank you for referring Arabella Galvan to my practice. Please find my assessment and plan below.              History of Present Illness     36-yea anesthesia options with the anesthesia department as she does have a family history of malignant hyperthermia          Sincerely,       Shea Allen MD   CC: No Recipients

## (undated) NOTE — LETTER
Date: 10/24/2024    Patient Name: Faith Echavarria          To Whom it may concern:    This letter has been written at the patient's request. The above patient was seen at [facility] for treatment of a medical condition.    This patient should be excused from attending work/school from *** through ***.    The patient may return to work/school on *** with the following limitations ***.        Sincerely,    Naomi Gao MD